# Patient Record
Sex: FEMALE | Race: WHITE
[De-identification: names, ages, dates, MRNs, and addresses within clinical notes are randomized per-mention and may not be internally consistent; named-entity substitution may affect disease eponyms.]

---

## 2017-03-05 ENCOUNTER — HOSPITAL ENCOUNTER (EMERGENCY)
Dept: HOSPITAL 18 - NAV ERS | Age: 82
Discharge: HOME | End: 2017-03-05
Payer: MEDICARE

## 2017-03-05 DIAGNOSIS — I10: ICD-10-CM

## 2017-03-05 DIAGNOSIS — S92.351A: ICD-10-CM

## 2017-03-05 DIAGNOSIS — S40.021A: ICD-10-CM

## 2017-03-05 DIAGNOSIS — S09.90XA: Primary | ICD-10-CM

## 2017-03-05 DIAGNOSIS — F41.9: ICD-10-CM
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
## 2017-03-05 PROCEDURE — 70450 CT HEAD/BRAIN W/O DYE: CPT

## 2017-03-05 NOTE — RAD
RIGHT HUMERUS 2 VIEWS:

 

INDICATION: 

Right shoulder bruising and pain subsequent to fall.

 

FINDINGS: 

Metallic anchor of the proximal right humerus present.  There is no fracture or dislocation.

 

IMPRESSION: 

No acute osseous abnormality of the right humerus.

 

POS: [facility]

## 2017-03-05 NOTE — RAD
RADIOGRAPH OF RIGHT FOOT 3 VIEWS:

 

INDICATION: 

Fall with pain.  

 

COMPARISON: 

No prior imaging.

 

FINDINGS: 

There is an obliquely oriented fracture of the mid to distal diaphysis of the 5th metatarsal with ov
erlying soft tissue swelling.  Subtle fracture lucency involves the medial aspect of the articular s
urface of the 5th metatarsal head.  Scattered osteoarthritis present.

 

IMPRESSION: 

Obliquely oriented, mildly displaced fracture of the 5th metatarsal with subtle involvement of the m
edial articular surface of the 5th metatarsal head.  There is overlying soft tissue swelling.

 

POS: [facility]

## 2017-03-05 NOTE — CT
CT HEAD NONCONTRAST:

 

INDICATION: 

Fall with head injury and pain.

 

FINDINGS: 

There is age-related parenchymal volume loss with compensatory dilatation of the ventricular system.
  Mild chronic microvascular ischemic disease.  Punctate hypodensity in the left thalamus may relate
 to lacunar infarction.  No intracranial hemorrhage, mass effect, or midline shift.  No paranasal si
nus fluid level.  No evidence of compressed calvarial fracture or pneumocephalus.

 

IMPRESSION: 

No intracranial hemorrhage or mass effect.

 

POS: [facility]

## 2018-12-14 ENCOUNTER — HOSPITAL ENCOUNTER (OUTPATIENT)
Dept: HOSPITAL 92 - SCSCT | Age: 83
Discharge: HOME | End: 2018-12-14
Attending: ANESTHESIOLOGY
Payer: MEDICARE

## 2018-12-14 DIAGNOSIS — M43.16: ICD-10-CM

## 2018-12-14 DIAGNOSIS — Z98.1: ICD-10-CM

## 2018-12-14 DIAGNOSIS — M48.07: ICD-10-CM

## 2018-12-14 DIAGNOSIS — M48.061: ICD-10-CM

## 2018-12-14 DIAGNOSIS — M47.816: ICD-10-CM

## 2018-12-14 DIAGNOSIS — M96.1: Primary | ICD-10-CM

## 2018-12-14 PROCEDURE — 72131 CT LUMBAR SPINE W/O DYE: CPT

## 2018-12-14 NOTE — CT
CT LUMBAR SPINE WITHOUT CONTRAST:

 

HISTORY: 

Low back pain.  The patient had a stimulator placed 4 months ago.  

 

COMPARISON: 

5/18/2016.

 

TECHNIQUE: 

Lumbar spine CT is performed without contrast.  Reformatted images are submitted for interpretation.

 

FINDINGS: 

There are bilateral transpedicular screws at L4, L5, and S1.  There is no evidence of perihardware zain
cency.  Lumbar spine vertebral height is maintained.  There is no fracture.  Dorsal column stimulator
 enters the central spinal canal at approximately the T12-L1 level.  Distal tips are not included on 
this exam.

 

There is mild rightward curvature of the lumbar spine.  Vacuum disk phenomenon at L1-L2, L2-L3, L3-L4
, and L4-L5.

 

There is 4.4 mm of retrolisthesis of L1 upon L2, 3.8 mm of retrolisthesis of L2 upon L3, 5.2 mm of an
terolisthesis of L4 upon L5, 11 mm anterolisthesis of L5 upon S1.  

 

Laminectomy defect at L3-L4, L4-L5, and the L5-S1 disk space.  There appear to be bilateral pars defe
cts at L5. 

 

Symmetric attenuation of the psoas muscles.  Exophytic hypodensity emanating from the left renal shital
ex measuring 7 cm compatible with a cyst.  Other visualized solid organs are unremarkable.

 

Limited evaluation of the contents of the central spinal canal and neural foramen due to technique.

 

T10-T11, T11-T12, and T12-L1:  No significant central canal stenosis of foraminal narrowing.

 

L1-L2:  Broad-based disk bulge and ligamentum flavum thickening result in mild central canal stenosis
.  Moderate to severe right and moderate left neural foraminal narrowing.

 

L2-L3:  Broad-based disk bulge, ligamentum flavum thickening, and facet hypertrophy result in moderat
e central canal stenosis.  Right neural foramen is moderately narrowed.  There is severe left foramin
al narrowing.

 

L3-L4:  Broad-based disk bulge is present.  Limited evaluation of the central spinal canal due to yenifer
m-attenuation artifact.  There appears to be narrowing of the left subarticular zone secondary to dis
k material as well as osteophyte formation from the end plate.  There is presumed obscuration of the 
traversing left L4 nerve root.  Overall, there is mild central canal stenosis.  Moderate to severe bi
lateral foraminal narrowing.

 

L4-L5:  Posterior laminectomy defect.  No high-grade central canal stenosis.  Moderate bilateral fora
cha narrowing.

 

L5-S1:  Posterior laminectomy defect.  No high-grade central canal stenosis.  Severe bilateral forami
nal narrowing.

 

IMPRESSION: 

1.  Lumbar fusion changes as above.

 

2.  Varying degrees of central canal stenosis and foraminal narrowing as detailed above.

 

3.  Spondylolysis of L5.  There is associated grade I anterolisthesis of L5 upon S1.

 

POS: [facility]

## 2018-12-16 ENCOUNTER — HOSPITAL ENCOUNTER (EMERGENCY)
Dept: HOSPITAL 18 - NAV ERS | Age: 83
Discharge: HOME | End: 2018-12-16
Payer: MEDICARE

## 2018-12-16 DIAGNOSIS — E78.5: ICD-10-CM

## 2018-12-16 DIAGNOSIS — I10: ICD-10-CM

## 2018-12-16 DIAGNOSIS — Z79.899: ICD-10-CM

## 2018-12-16 DIAGNOSIS — R04.2: Primary | ICD-10-CM

## 2018-12-16 DIAGNOSIS — F41.9: ICD-10-CM

## 2018-12-16 LAB
BASOPHILS # BLD AUTO: 0.1 THOU/UL (ref 0–0.2)
BASOPHILS NFR BLD AUTO: 1.1 % (ref 0–1)
EOSINOPHIL # BLD AUTO: 0.2 THOU/UL (ref 0–0.7)
EOSINOPHIL NFR BLD AUTO: 4.2 % (ref 0–10)
HGB BLD-MCNC: 12.4 G/DL (ref 12–16)
LYMPHOCYTES # BLD AUTO: 2 THOU/UL (ref 1.2–3.4)
LYMPHOCYTES NFR BLD AUTO: 36.8 % (ref 21–51)
MCH RBC QN AUTO: 28.8 PG (ref 27–31)
MCV RBC AUTO: 90.8 FL (ref 78–98)
MONOCYTES # BLD AUTO: 0.5 THOU/UL (ref 0.11–0.59)
MONOCYTES NFR BLD AUTO: 9.6 % (ref 0–10)
NEUTROPHILS # BLD AUTO: 2.6 THOU/UL (ref 1.4–6.5)
NEUTROPHILS NFR BLD AUTO: 48.3 % (ref 42–75)
PLATELET # BLD AUTO: 234 THOU/UL (ref 130–400)
RBC # BLD AUTO: 4.29 MILL/UL (ref 4.2–5.4)
WBC # BLD AUTO: 5.5 THOU/UL (ref 4.8–10.8)

## 2018-12-16 PROCEDURE — 71046 X-RAY EXAM CHEST 2 VIEWS: CPT

## 2018-12-16 PROCEDURE — 85025 COMPLETE CBC W/AUTO DIFF WBC: CPT

## 2018-12-16 NOTE — RAD
RADIOGRAPH CHEST 2 VIEWS:

 

HISTORY: 

An 83-year-old female with hemoptysis.

 

FINDINGS:

There is no air space density, pulmonary edema, pleural effusion, pneumothorax, or cardiomegaly.

 

IMPRESSION: 

No acute cardiopulmonary findings.

 

 

jn []

 

POS: [facility]

## 2019-04-23 ENCOUNTER — HOSPITAL ENCOUNTER (OUTPATIENT)
Dept: HOSPITAL 92 - SDC | Age: 84
Discharge: HOME | End: 2019-04-23
Attending: ANESTHESIOLOGY
Payer: MEDICARE

## 2019-04-23 VITALS — BODY MASS INDEX: 23.3 KG/M2

## 2019-04-23 DIAGNOSIS — I10: ICD-10-CM

## 2019-04-23 DIAGNOSIS — Z79.899: ICD-10-CM

## 2019-04-23 DIAGNOSIS — G89.29: ICD-10-CM

## 2019-04-23 DIAGNOSIS — T85.192A: Primary | ICD-10-CM

## 2019-04-23 DIAGNOSIS — M54.16: ICD-10-CM

## 2019-04-23 DIAGNOSIS — Z88.5: ICD-10-CM

## 2019-04-23 DIAGNOSIS — Z88.8: ICD-10-CM

## 2019-04-23 DIAGNOSIS — Z88.2: ICD-10-CM

## 2019-04-23 DIAGNOSIS — M48.061: ICD-10-CM

## 2019-04-23 PROCEDURE — 76000 FLUOROSCOPY <1 HR PHYS/QHP: CPT

## 2019-04-23 PROCEDURE — 0JPT0MZ REMOVAL OF STIMULATOR GENERATOR FROM TRUNK SUBCUTANEOUS TISSUE AND FASCIA, OPEN APPROACH: ICD-10-PCS | Performed by: ANESTHESIOLOGY

## 2019-04-23 PROCEDURE — 00PU3MZ REMOVAL OF NEUROSTIMULATOR LEAD FROM SPINAL CANAL, PERCUTANEOUS APPROACH: ICD-10-PCS | Performed by: ANESTHESIOLOGY

## 2019-04-24 NOTE — OP
DATE OF PROCEDURE:  04/23/2019



PREOPERATIVE DIAGNOSES:  

1. Lumbar stenosis.

2. Chronic radiculopathy.

3. Chronic pain.

4. Failure of spinal cord stimulator.



POSTOPERATIVE DIAGNOSES:  

1. Lumbar stenosis.

2. Chronic radiculopathy.

3. Chronic pain.

4. Failure of spinal cord stimulator.



PROCEDURES PERFORMED:  

1. Removal of spinal cord stimulation electrode array on the right side.

2. Removal of spinal cord stimulation electrode array on the left side.

3. Removal of internal pulse generator.



ANESTHESIA:  TIVA.



COMPLICATIONS:  None.



ESTIMATED BLOOD LOSS:  Less than 10 mL.



SUMMARY:  Risks and benefits were discussed.  Informed consent was obtained and 
the

patient was taken to the OR, prepped and draped in standard fashion using

ChloraPrep, let to dry.  1% lidocaine mixed with 0.25% Marcaine with epinephrine

50:50 was used for skin and subcutaneous anesthesia.  The posterior incision was

chosen first, incision and blunt dissection were made down, exposing the lead

anchors easily.  They were dissected out.  Each lead suture anchor was 
identified,

ligated, and removed.  The right anchor was then removed along with the right 
lead.

This was repeated for the left electrode array, each was sutures with two 2-0 
silk

sutures.  They were ligated and removed and the left electrode array was 
removed.

Attention was then turned to the IPG in the right buttock.  Incision was made 
over

blunt dissection to expose the internal pulse generator.  It was easily removed 
from

the pocket and the leads were removed from the tunnel completing the explant of 
both

electrodes arrays, both lead anchors, retaining sutures, and the internal pulse

generator.  All counts were correct x2.  Closure was accomplished at both 
incisions

in layers with interrupted 2-0 Vicryl and the skin was closed with a

running subcuticular 4-0 Rapide at each incision.  Dermabond was used to seal 
the

skin.  Dermabond was let dry, 4 x 4 dressings were placed along with Medipore 
tape.

No complications.  Tolerated the procedure well. 







Job ID:  [id number]



[facility]

## 2019-07-02 ENCOUNTER — HOSPITAL ENCOUNTER (OUTPATIENT)
Dept: HOSPITAL 92 - TBSIIMAG | Age: 84
Discharge: HOME | End: 2019-07-02
Attending: ANESTHESIOLOGY
Payer: MEDICARE

## 2019-07-02 DIAGNOSIS — M47.816: ICD-10-CM

## 2019-07-02 DIAGNOSIS — M48.062: Primary | ICD-10-CM

## 2019-07-02 DIAGNOSIS — Z98.1: ICD-10-CM

## 2019-07-02 DIAGNOSIS — M16.11: ICD-10-CM

## 2019-07-02 LAB — ESTIMATED GFR-MDRD - POC: 60

## 2019-07-02 PROCEDURE — 74018 RADEX ABDOMEN 1 VIEW: CPT

## 2019-07-02 PROCEDURE — 72158 MRI LUMBAR SPINE W/O & W/DYE: CPT

## 2019-07-02 PROCEDURE — 82565 ASSAY OF CREATININE: CPT

## 2019-07-02 NOTE — MRI
MRI OF THE LUMBAR SPINE WITH AND WITHOUT CONTRAST:

7/2/19

 

HISTORY: 

Lumbar stenosis with neurogenic claudication.

Previous lumbar surgery, x2. Multiple falls. Right leg and low back pain. 

 

COMPARISON: 

None.

 

TECHNIQUE:  

MRI of the lumbar spine is performed with and without intravenous gadolinium administration. Multiseq
uential, multiplanar imaging is performed.     

 

FINDINGS: 

There are bilateral transpedicular screws at L4, L5 and S1. Associated metallic susceptibility artifa
ct. There is Ts1 marrow signal hypointensity with associated T2 and STIR hyperintensity on the superi
or end plate of L4 likely due to degenerative change. 5 mm of retrolisthesis of L2 upon L3, 4 mm of r
etrolisthesis of L3 upon L4, 7.8 mm of anterolisthesis of L5 upon S1. 

 

Appropriate signal intensity in the paraspinal muscles. 

 

Large T2 hyperintense lesion in the left kidney is presumed to be a cyst. 

 

T11-T12: Left and right paracentral disc bulges, ligamentum flavum thickening and facet hypertrophy r
esult in mild central canal stenosis. Neural foramina are patent bilaterally. 

 

T12-L1: Small left and right paracentral disc bulges, along with ligamentum flavum thickening and fac
et hypertrophy resulting in mild central canal stenosis. Mild bilateral foraminal narrowing. 

 

L1-L2: Severe loss of disc space height. Generalized disc bulge, ligamentum flavum thickening and fac
et hypertrophy result in mild central canal stenosis. Narrowing of the right subarticular zone with m
ass effect and obscuration of the traversing right L2 nerve root. Moderate right and left neural fora
cha narrowing.

 

L2-L3: Desiccation with severe loss of disc space height. There is a left subarticular disc osteophyt
e complex. There is a left laminectomy defect. Mild enhancing scar tissue at the operative site. Over
all, there is mild central canal stenosis. There is moderate right and severe left neural foraminal n
arrowing. 

 

L3-L4: Desiccation with severe loss of disc space height. There appears to be narrowing of the left s
ubarticular zone secondary to disc material. At least mild central canal stenosis. Moderate right and
 moderate to severe left foraminal narrowing. 

 

L4-L5: Moderate loss of disc space height. Left laminectomy defect is noted. No evidence of high grad
e central canal stenosis. Moderate to severe right and moderate left foraminal narrowing. 

 

L5-S1:  Posterior laminectomy defect. There is a T2 hyperintense lesion with peripheral enhancement m
easuring 1.7 cm craniocaudal x 3.1 cm mediolateral x 1 cm anterior posterior. Postoperative fluid col
lection is favored. An infected fluid collection cannot be excluded. 

 

Postcontrast images do not demonstrate any abnormal enhancement within the thecal sac including the c
auda equina and conus medullaris. 

 

IMPRESSION: 

1.      Extensive lumbar fusion as described above. There is a peripherally enhancing cystic lesion a
t the operative site, which is presumed to be due to postoperative change versus infected fluid colle
ction. Correlate clinically. 

2.      Varying degrees of central canal stenosis and neural foraminal narrowing as detailed above.

 

Code T 

 

POS: [facility]

## 2019-09-13 ENCOUNTER — HOSPITAL ENCOUNTER (OUTPATIENT)
Dept: HOSPITAL 92 - SCSCT | Age: 84
Discharge: HOME | End: 2019-09-13
Attending: INTERNAL MEDICINE
Payer: MEDICARE

## 2019-09-13 DIAGNOSIS — Q61.9: ICD-10-CM

## 2019-09-13 DIAGNOSIS — R10.84: Primary | ICD-10-CM

## 2019-09-13 LAB — ESTIMATED GFR-MDRD - POC: 69

## 2019-09-13 PROCEDURE — 82565 ASSAY OF CREATININE: CPT

## 2019-09-13 PROCEDURE — 74177 CT ABD & PELVIS W/CONTRAST: CPT

## 2019-09-13 NOTE — CT
CT ABDOMEN AND PELVIS WITH IV CONTRAST:

9/13/19

 

Oral contrast was also given.

 

Multiplanar reconstruction.

 

INDICATION: 

Abdominal pain. 

 

There are no comparison CT abdomen and pelvis exams. Comparison is made to a prior CT chest from 2015
. 

 

Images through the lung bases reveal interstitial thickening and numerous small pulmonary nodules. A 
larger nodule in the left lung base measuring up to 8 to 9 mm is again seen. These nodular opacities 
appear stable. 

 

Liver, spleen, and pancreas appear unremarkable. 

 

The gallbladder is mildly distended. No pericholecystic edema seen. Gallstones may not be apparent on
 CT. 

 

The adrenal glands are unremarkable. 

 

There is a large left renal cyst which was imaged on the prior chest CT and does appear stable. This 
cyst measures up to 7 cm. 

 

There is another complex low density probable cystic lesion which is exophytic from the posterior mid
 left kidney measuring 1.2 cm. This lesion exhibits peripheral calcification and possibly peripheral 
enhancement. This lesion was also imaged on the prior exam and appears stable. 

 

Tiny low densities subcentimeter cystic lesions in the right kidney appear stable. No hydronephrosis 
or urinary calculus.

 

Urinary bladder mildly distended and unremarkable. 

 

Aorta normal caliber. 

 

Small bowel loops appear normal. Appendix is not identified. Stool throughout the colon. Degenerative
 and postoperative changes in the lumbar spine. 

 

IMPRESSION: 

1.      No acute process. Renal cystic lesions appear stable. A complex exophytic lesion from the pos
terior mid left kidney appears stable from the prior exam of 2015. 

2.      Numerous nodules in the lung bases with interstitial thickening appears stable. 

 

POS: [facility]

## 2020-01-05 ENCOUNTER — HOSPITAL ENCOUNTER (OUTPATIENT)
Dept: HOSPITAL 92 - ERS | Age: 85
Setting detail: OBSERVATION
LOS: 2 days | Discharge: HOME | End: 2020-01-07
Attending: FAMILY MEDICINE | Admitting: FAMILY MEDICINE
Payer: MEDICARE

## 2020-01-05 ENCOUNTER — HOSPITAL ENCOUNTER (EMERGENCY)
Dept: HOSPITAL 18 - NAV ERS | Age: 85
Discharge: ANOTHER HOSPITAL | End: 2020-01-05
Payer: MEDICARE

## 2020-01-05 VITALS — BODY MASS INDEX: 21.7 KG/M2

## 2020-01-05 DIAGNOSIS — Z88.1: ICD-10-CM

## 2020-01-05 DIAGNOSIS — F41.9: ICD-10-CM

## 2020-01-05 DIAGNOSIS — E78.5: ICD-10-CM

## 2020-01-05 DIAGNOSIS — Z79.899: ICD-10-CM

## 2020-01-05 DIAGNOSIS — R07.89: Primary | ICD-10-CM

## 2020-01-05 DIAGNOSIS — E03.9: ICD-10-CM

## 2020-01-05 DIAGNOSIS — R91.1: ICD-10-CM

## 2020-01-05 DIAGNOSIS — R00.2: ICD-10-CM

## 2020-01-05 DIAGNOSIS — Z88.8: ICD-10-CM

## 2020-01-05 DIAGNOSIS — Z88.2: ICD-10-CM

## 2020-01-05 DIAGNOSIS — E78.00: ICD-10-CM

## 2020-01-05 DIAGNOSIS — I10: ICD-10-CM

## 2020-01-05 DIAGNOSIS — Z88.5: ICD-10-CM

## 2020-01-05 LAB
ALBUMIN SERPL BCG-MCNC: 3.7 G/DL (ref 3.4–4.8)
ALP SERPL-CCNC: 71 U/L (ref 40–110)
ALT SERPL W P-5'-P-CCNC: 22 U/L (ref 8–55)
ANION GAP SERPL CALC-SCNC: 12 MMOL/L (ref 10–20)
AST SERPL-CCNC: 60 U/L (ref 5–34)
BASOPHILS # BLD AUTO: 0.1 THOU/UL (ref 0–0.2)
BASOPHILS NFR BLD AUTO: 1.5 % (ref 0–1)
BILIRUB SERPL-MCNC: 0.2 MG/DL (ref 0.2–1.2)
BUN SERPL-MCNC: 20 MG/DL (ref 9.8–20.1)
CALCIUM SERPL-MCNC: 9.7 MG/DL (ref 7.8–10.44)
CHLORIDE SERPL-SCNC: 100 MMOL/L (ref 98–107)
CO2 SERPL-SCNC: 27 MMOL/L (ref 23–31)
CREAT CL PREDICTED SERPL C-G-VRATE: 0 ML/MIN (ref 70–130)
EOSINOPHIL # BLD AUTO: 0.2 THOU/UL (ref 0–0.7)
EOSINOPHIL NFR BLD AUTO: 3.5 % (ref 0–10)
GLOBULIN SER CALC-MCNC: 3.1 G/DL (ref 2.4–3.5)
GLUCOSE SERPL-MCNC: 95 MG/DL (ref 83–110)
HGB BLD-MCNC: 11.1 G/DL (ref 12–16)
LIPASE SERPL-CCNC: 54 U/L (ref 8–78)
LYMPHOCYTES # BLD AUTO: 1.3 THOU/UL (ref 1.2–3.4)
LYMPHOCYTES NFR BLD AUTO: 29.2 % (ref 21–51)
MCH RBC QN AUTO: 30.2 PG (ref 27–31)
MCV RBC AUTO: 95.6 FL (ref 78–98)
MONOCYTES # BLD AUTO: 0.6 THOU/UL (ref 0.11–0.59)
MONOCYTES NFR BLD AUTO: 12.3 % (ref 0–10)
NEUTROPHILS # BLD AUTO: 2.4 THOU/UL (ref 1.4–6.5)
NEUTROPHILS NFR BLD AUTO: 53.6 % (ref 42–75)
PLATELET # BLD AUTO: 184 THOU/UL (ref 130–400)
POTASSIUM SERPL-SCNC: 4.1 MMOL/L (ref 3.5–5.1)
RBC # BLD AUTO: 3.67 MILL/UL (ref 4.2–5.4)
SODIUM SERPL-SCNC: 135 MMOL/L (ref 136–145)
TROPONIN I SERPL DL<=0.01 NG/ML-MCNC: 0.01 NG/ML (ref ?–0.03)
WBC # BLD AUTO: 4.5 THOU/UL (ref 4.8–10.8)

## 2020-01-05 PROCEDURE — A9500 TC99M SESTAMIBI: HCPCS

## 2020-01-05 PROCEDURE — 85025 COMPLETE CBC W/AUTO DIFF WBC: CPT

## 2020-01-05 PROCEDURE — 71046 X-RAY EXAM CHEST 2 VIEWS: CPT

## 2020-01-05 PROCEDURE — 71275 CT ANGIOGRAPHY CHEST: CPT

## 2020-01-05 PROCEDURE — 84484 ASSAY OF TROPONIN QUANT: CPT

## 2020-01-05 PROCEDURE — 93005 ELECTROCARDIOGRAM TRACING: CPT

## 2020-01-05 PROCEDURE — 99285 EMERGENCY DEPT VISIT HI MDM: CPT

## 2020-01-05 PROCEDURE — 36415 COLL VENOUS BLD VENIPUNCTURE: CPT

## 2020-01-05 PROCEDURE — 80053 COMPREHEN METABOLIC PANEL: CPT

## 2020-01-05 PROCEDURE — 76705 ECHO EXAM OF ABDOMEN: CPT

## 2020-01-05 PROCEDURE — 78451 HT MUSCLE IMAGE SPECT SING: CPT

## 2020-01-05 PROCEDURE — 83690 ASSAY OF LIPASE: CPT

## 2020-01-05 PROCEDURE — 83880 ASSAY OF NATRIURETIC PEPTIDE: CPT

## 2020-01-05 PROCEDURE — 85379 FIBRIN DEGRADATION QUANT: CPT

## 2020-01-05 PROCEDURE — G0378 HOSPITAL OBSERVATION PER HR: HCPCS

## 2020-01-05 NOTE — RAD
PA AND LATERAL CHEST:

 

Date:  01/05/20

 

HISTORY:  

Shortness of breath. 

 

FINDINGS:

 

Comparison made with exam of 12/16/18. 

 

The heart size is normal. The lungs are expanded without focal areas of consolidation, pneumothoraces
, or pleural effusions. There are degenerative changes in the spine. 

 

IMPRESSION: 

No acute process. 

 

 

 

POS: [facility]

## 2020-01-05 NOTE — CT
CT ANGIO CHEST PERFORMED WITH IV CONTRAST ENHANCEMENT WITH 3D RECONSTRUCTIONS:

 

Date:  01/05/2020

 

HISTORY:  

Chest pain. Elevated D-Dimer. Shortness of breath. 

 

COMPARISON:  

CT abdomen and pelvis performed 09/13/19. 

 

FINDINGS:

There are numerous small bilateral pulmonary nodules noted. The larger nodules are within the left lo
wer lobe where nodules in the 8-9 mm range are seen. In comparing to the previous CT of the abdomen, 
these lower lobe pulmonary nodules appear stable. On that exam, it is described as the pulmonary nodu
les being stable as compared to a 2015 exam, which I do not have for comparison. 

 

There is no significant mediastinal, hilar, or axillary lymphadenopathy. 

 

The thoracic aorta is normal in caliber. There is good pulmonary artery opacification and no CT evide
nce for pulmonary embolus. 

 

Visualized liver parenchyma shows no findings. 

 

A left renal cyst is partially visualized. 

 

IMPRESSION: 

1.  No CT evidence of pulmonary embolus. 

2.  Numerous pulmonary nodules. The etiology of these are unclear. These are not calcified. Metastati
c disease is not excluded. The nodules in the lung bases are stable as compared to the 09/13/19 study
. 

 

 

POS: [facility]

## 2020-01-06 LAB — TROPONIN I SERPL DL<=0.01 NG/ML-MCNC: (no result) NG/ML (ref ?–0.03)

## 2020-01-06 RX ADMIN — HYDROCODONE BITARTRATE AND ACETAMINOPHEN PRN TAB: 7.5; 325 TABLET ORAL at 17:44

## 2020-01-06 RX ADMIN — HYDROCODONE BITARTRATE AND ACETAMINOPHEN PRN TAB: 7.5; 325 TABLET ORAL at 09:04

## 2020-01-06 NOTE — HP
CHIEF COMPLAINT:  Abdominal pain.



HISTORY OF PRESENT ILLNESS:  Patient is an 84-year-old female who stated that when

her dog jumped to her lap, she started having this burning sensation starting from

her abdomen going up to her neck and she was little nauseated, so she came into the

hospital for further evaluation.  The patient states that whenever her dog jumps in

her lap, these symptoms occur and she thought that she is "allergic to the dog."

Patient's dog weighs about 4 pounds.  Patient recently had an EGD last month, which

did not show any acute abnormalities.  The pathology specimen only indicated patient

has mild active esophagitis with some reflux.  No Brooke's was noted.  Patient

states that she has had lower abdominal pain.  She has had colonoscopies and

continues to have the abdominal pain without any active diagnosis. 



PAST MEDICAL HISTORY:  As of the following:  She has a history of hyperlipidemia,

hypercholesterolemia, hypertension, hypothyroidism, and chronic pain. 



SURGICAL HISTORY:  She has had a hysterectomy and right shoulder surgery.



PSYCHIATRIC HISTORY:  Includes anxiety.



SOCIAL HISTORY:  She denies any alcohol use, drug use, or smoking history.  She is a

full code.  Lives with her family. 



ALLERGIES:  SHE IS ALLERGIC TO CIPROFLOXACIN, NAUSEA; CODEINE, NAUSEA; MORPHINE,

RASH; SULFA, NAUSEA; AND ZOLPIDEM. 



MEDICATIONS:  She is on:

1. Celexa 20 mg daily.

2. Levothyroxine 112 mcg daily.

3. Alprazolam 1 mg p.o. daily.

4. Metoprolol 50 mg b.i.d.



PHYSICAL EXAMINATION:

VITAL SIGNS:  Temperature of 98.8, __________, 16, pulse is 63, and blood pressure

is 120/60. 

GENERAL:  She is awake, alert, and oriented x3.  Does not appear in distress. 

HEENT:  Normocephalic and atraumatic.  No lymphadenopathy noted.  Pupils equal and

reactive to light. 

CV:  S1 and S2 present.  No murmurs, rubs, or gallops. 

LUNGS:  Clear to auscultation.  No rhonchi or wheezes noted. 

ABDOMEN:  Soft.  Bowel sounds present x2.  Mild pain upon palpation to bilateral

lower abdominal area. 

EXTREMITIES:  No edema noted.  Pedal pulses are present x2. NEUROVASCULAR:  There

are no focal deficits noted. 

SKIN:  No cuts, lesions, or bruises noted.



LABORATORY:  Her troponin x3 were negative.  EKG, no acute changes.  WBCs of 4.5,

hemoglobin of 11.1, hematocrit of 35.1, and platelets of 184.  Her D-dimer was

elevated at 1.1.  Her BNP was 240.  Sodium of 135, potassium of 4.1, BUN of 20,

creatinine 0.86, and AST of __________. 



ASSESSMENT/PLAN:  Patient is a very pleasant 84-year-old female who presents to the

hospital with complaints of atypical chest pain. 

1. Atypical chest pain.  Patient did have a CTA which indicated multiple pulmonary

nodules.  She is aware of this.  She states that she is supposed to follow up with a

CAT scan as an outpatient.  No embolism was noted.  She has no cardiac history.  Her

troponins x3 were negative.  We will continue her home medications.  We will also go

ahead and order a stress test given her risk factors and her age.  If the stress

test is negative, I will discharge her.  I will also get a right upper quadrant

ultrasound to rule out any other etiology, however, she was seen in the hospital in

September of 2019, she did have a CT of abdomen and pelvis, which indicated just

numerous nodules to the lungs and also a renal cyst that was stable and a complex

exophytic lesion from the posterior mid left kidney, which appears to be stable from

exam of 2015.  Patient is aware of this. 

2. History of anxiety.  We will continue her home medications.

3. Hypertension.  We will continue her home medications.

4. Deep venous thrombosis prophylaxis.  We will put the patient on SCDs.







Job ID:  [id number]

## 2020-01-07 VITALS — SYSTOLIC BLOOD PRESSURE: 158 MMHG | DIASTOLIC BLOOD PRESSURE: 67 MMHG

## 2020-01-07 VITALS — TEMPERATURE: 97.8 F

## 2020-01-07 RX ADMIN — HYDROCODONE BITARTRATE AND ACETAMINOPHEN PRN TAB: 7.5; 325 TABLET ORAL at 08:13

## 2020-01-07 NOTE — ULT
Sonogram right upper quadrant



HISTORY: Right upper quadrant pain.



FINDINGS: Gallbladder shows no focal abnormalities. Common duct is 0.3 cm. Liver within normal limits
. No free fluid. Incidental note of a small cyst of the right kidney.



IMPRESSION: No evidence of gallstones or acute biliary obstruction.



Reported By: KENZIE Burnett 

Electronically Signed:  1/7/2020 7:39 AM

## 2020-01-07 NOTE — NM
NM Card Spec Sgl sty st or rst



History: Chest pain



Comparison: None.



Findings: Rest only images were obtained. The patient refused stress component of the exam.



Only nonattenuation corrected rest images were obtained.



Impression: Nondiagnostic examination as patient refused the stress component of the exam and only no
nattenuation corrected images were obtained.



Reported By: Subhash Pearce 

Electronically Signed:  1/7/2020 9:05 AM

## 2020-01-09 NOTE — DIS
DATE OF ADMISSION:  01/05/2020



DATE OF DISCHARGE:  01/07/2020



DISCHARGE DIAGNOSES:  

1. Atypical chest pain.

2. Anxiety.

3. Hypertension.

4. Pulmonary nodules.



HOSPITAL COURSE:  The patient is a very pleasant 84-year-old female who initially

presented to the hospital for having a burning like sensation starting from her

abdomen going up to her neck area.  She stated that whenever her dog jumps on her

lap, she gets that sensation.  The patient recently had an EGD last month and did

not show any acute abnormalities.  The patient at this time did undergo a stress

test, however, she only did the first part of the stress test and she refused to do

the second part of the stress test.  The patient did have a CTA to rule out a PE.

The PE was ruled out.  She did have numerous pulmonary nodules, which she is aware

of, which she is supposed to get further testing for this. 



I did talk extensively with the patient.  Also got a right upper quadrant

ultrasound, which did not indicate any gallstones or any biliary obstruction.  The

patient stated that she felt better.  She has been through a lot of issues

especially with her family and has been having a lot of anxiety.  I will discharge

the patient home.  She will follow up with her primary and also she is aware of her

pulmonary nodules and she is going to get a workup as an outpatient. 



HOME MEDICATIONS:  

1. Levothyroxine 50 mcg daily.

2. Metoprolol 1 p.o. daily.

3. Pantoprazole 40 mg twice a day.

4. Alprazolam 2 mg daily.

5. Duloxetine 20 mg p.o. daily.



PHYSICAL EXAMINATION:

VITAL SIGNS:  Temperature 97.8, pulse 63, respirations 15, 97% on room air, blood

pressure 158/67. 

GENERAL:  She is awake, alert, and oriented x3.  Does not appear in any distress. 

CV:  S1 and S2 present.  No murmurs, rubs, or gallops. 

ABDOMEN:  Soft and nontender.  Bowel sounds are present x2.



DISCHARGE INSTRUCTIONS:  Again, she will be discharged home.  She will follow up

with her primary. 







Job ID:  [id number]

## 2020-03-21 ENCOUNTER — HOSPITAL ENCOUNTER (EMERGENCY)
Dept: HOSPITAL 92 - ERS | Age: 85
Discharge: HOME | End: 2020-03-21
Payer: MEDICARE

## 2020-03-21 DIAGNOSIS — E03.9: ICD-10-CM

## 2020-03-21 DIAGNOSIS — E78.00: ICD-10-CM
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
## 2020-03-21 DIAGNOSIS — E78.5: ICD-10-CM

## 2020-03-21 DIAGNOSIS — I10: ICD-10-CM

## 2020-03-21 DIAGNOSIS — S00.03XA: Primary | ICD-10-CM

## 2020-03-21 DIAGNOSIS — S30.0XXA: ICD-10-CM

## 2020-03-21 DIAGNOSIS — Z79.899: ICD-10-CM
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
## 2020-03-21 DIAGNOSIS — F41.9: ICD-10-CM

## 2020-03-21 DIAGNOSIS — R11.0: ICD-10-CM

## 2020-03-21 PROCEDURE — 96374 THER/PROPH/DIAG INJ IV PUSH: CPT

## 2020-03-21 PROCEDURE — 72125 CT NECK SPINE W/O DYE: CPT

## 2020-03-21 PROCEDURE — 72220 X-RAY EXAM SACRUM TAILBONE: CPT

## 2020-03-21 PROCEDURE — 70450 CT HEAD/BRAIN W/O DYE: CPT

## 2020-03-21 NOTE — CT
PRELIMINARY REPORT/[facility]/EMERGENCY AFTER HOURS PROCEDURE:



EXAM: CT Cervical Spine Without Intravenous Contrast. 



CLINICAL HISTORY: ER 2... 84F brought in via EMS c/o sacral & occipital pain s/p mechanical fall at a
ssisted living facility. 



TECHNIQUE: Axial computed tomography images of the cervical spine without intravenous contrast. Sagit
donald and coronal reformations performed. 



COMPARISON:  CT  - CT BRAIN WO CON  - 03/21/2020 01:18 AM CDT 



FINDINGS: 

BONES: No acute fracture or focal osseous lesion. Bony alignment is anatomic. 

DISCS / DEGENERATIVE CHANGES: Moderate to severe multilevel cervical spondylosis. 

SOFT TISSUES: Scattered nodules in the lung apices, largest measuring 4 mm on the right lung apex. 

MISCELLANEOUS: Mild smooth interlobular septal thickening and centrilobular groundglass. 



IMPRESSION: 

1. No acute abnormality. 

2. Moderate to severe multilevel cervical spondylosis. 

3. Scattered nodules in the lung apices, largest measuring 4 mm on the right lung apex.  If patient i
s at high risk for pulmonary malignancy, consider follow-up CT chest in one year. 

4. Mild smooth interlobular septal thickening and centrilobular groundglass.  This can be seen with m
ild pulmonary edema.



ELECTRONICALLY SIGNED BY:

Patricio Cerrato DO

Mar 21, 2020 1:33:26 AM CDT





FINAL REPORT



CT CERVICAL SPINE WITHOUT CONTRAST:



HISTORY: 

Trauma. Pain.



COMPARISON: 

None



FINDINGS: 

No craniocervical dissociation. Appropriate alignment of the lateral masses of C1 and C2. Intact odon
toid process.



Appropriate alignment of the facets. Slight reversal cervical lordosis centered at the C5-C6 level.

Soft tissue neck structures: No mass, lymphadenopathy or hematoma. No prevertebral soft tissue swelli
ng.



Upper mediastinum and lung apices: Groundglass opacities with septal thickening. Well-circumscribed s
olid nodule in the right lung apex measuring 0.4 cm.



Central spinal canal: Severe degenerative disc disease at C5-C6 and C6-C7. There are varying degrees 
of central canal stenosis and neural foraminal narrowing on the basis of degenerative change.



Vertebral bodies: Cervical spine vertebral body height is maintained. No fracture.





IMPRESSION:

1. This report is in agreement with the initial report by [facility]. No cervical spine fractur
e.

2. Solid nodule in the right lung apex.

Code lung nodule

Transcribed Date/Time: 3/21/2020 8:04 AM



Reported By: Tiara Sorto 

Electronically Signed:  3/21/2020 9:08 AM

## 2020-03-21 NOTE — RAD
THREE VIEWS SACRUM AND COCCYX:



HISTORY: 

Pain. Fall.



FINDINGS: 

Uncomplicated bilateral transpedicular screws at L4, L5 and S1. No perihardware lucency. There appear
s to be grade 1 anterolisthesis of L5 upon S1. Visualized sacral alar preserved. Visualized bony

pelvis appears to be intact. There is severe degenerative change in the right hip joint space with lo
ss of joint space height, sclerosis and subchondral cyst formation.



IMPRESSION:

1. Uncomplicated fusion changes in the distal lumbar spine and lumbosacral junction.

2. No evidence of a lumbar spine fracture.

3. Severe degenerative changes in the right hip.



Transcribed Date/Time: 3/21/2020 8:13 AM



Reported By: Tiara Sorto 

Electronically Signed:  3/21/2020 9:06 AM

## 2020-03-21 NOTE — CT
PRELIMINARY REPORT/[facility]/EMERGENCY AFTER HOURS PROCEDURE:



EXAM: CT Head Without Intravenous Contrast. 



CLINICAL HISTORY: ER 2... 84F brought in via EMS c/o sacral & occipital pain s/p mechanical fall at a
ssisted living facility. 



TECHNIQUE: Axial computed tomography images of the head/brain without intravenous contrast. 



COMPARISON:  CT  - CT CERVICAL SPINE WO CON  - 03/21/2020 01:18 AM CDT 



FINDINGS: 

BRAIN: Mild cerebral volume loss and ex vacuo dilatation of the ventricles. Few scattered periventric
ular and subcortical white matter hypodensities. 

VENTRICLES: No hydrocephalus. 

ORBITS: The orbits are unremarkable. 

SINUSES AND MASTOIDS: The paranasal sinuses and mastoid air cells are clear. 

SOFT TISSUES: No significant facial or scalp soft tissue swelling evident. No radiopaque foreign body
 is seen. 

BONES: No acute skull fracture. 



IMPRESSION: 

1. No acute intracranial abnormality. 

2. Few scattered periventricular and subcortical white matter hypodensities.  These are nonspecific b
ut can be seen with chronic white matter microvascular ischemic changes.



ELECTRONICALLY SIGNED BY:

Patricio Cerrato DO

Mar 21, 2020 1:30:30 AM CDT





FINAL REPORT



HEAD CT WITHOUT CONTRAST:



HISTORY: 

Mechanical fall. Pain.



COMPARISON: 

3/5/2017.



FINDINGS:

Hemorrhage: No intraparenchymal hemorrhage or extra-axial hematoma.

Brain parenchyma: Cortical gray-white matter differentiation is preserved. No mass effect or midline 
shift. Basilar cisterns are patent.

Ventricular system: Ventricles and sulci are patent and symmetric.

Calvarium: Intact.

Sinuses and mastoid air cells: Adequate aeration.



IMPRESSION:



1. This report is in agreement with the preliminary report by [facility].

2. No intracranial posttraumatic sequelae.







Transcribed Date/Time: 3/21/2020 8:08 AM



Reported By: Tiara Sorto 

Electronically Signed:  3/21/2020 9:07 AM

## 2020-05-22 ENCOUNTER — HOSPITAL ENCOUNTER (OUTPATIENT)
Dept: HOSPITAL 92 - LABBT | Age: 85
Discharge: HOME | End: 2020-05-22
Attending: SURGERY
Payer: MEDICARE

## 2020-05-22 DIAGNOSIS — K40.90: ICD-10-CM

## 2020-05-22 DIAGNOSIS — Z01.818: Primary | ICD-10-CM

## 2020-05-22 LAB
ALBUMIN SERPL BCG-MCNC: 3.7 G/DL (ref 3.4–4.8)
ALP SERPL-CCNC: 61 U/L (ref 40–110)
ALT SERPL W P-5'-P-CCNC: 11 U/L (ref 8–55)
ANION GAP SERPL CALC-SCNC: 10 MMOL/L (ref 10–20)
AST SERPL-CCNC: 32 U/L (ref 5–34)
BASOPHILS # BLD AUTO: 0.1 THOU/UL (ref 0–0.2)
BASOPHILS NFR BLD AUTO: 1.1 % (ref 0–1)
BILIRUB SERPL-MCNC: 0.4 MG/DL (ref 0.2–1.2)
BUN SERPL-MCNC: 20 MG/DL (ref 9.8–20.1)
CALCIUM SERPL-MCNC: 9.6 MG/DL (ref 7.8–10.44)
CHLORIDE SERPL-SCNC: 102 MMOL/L (ref 98–107)
CO2 SERPL-SCNC: 27 MMOL/L (ref 23–31)
CREAT CL PREDICTED SERPL C-G-VRATE: 0 ML/MIN (ref 70–130)
EOSINOPHIL # BLD AUTO: 0.2 THOU/UL (ref 0–0.7)
EOSINOPHIL NFR BLD AUTO: 4.8 % (ref 0–10)
GLOBULIN SER CALC-MCNC: 3.7 G/DL (ref 2.4–3.5)
GLUCOSE SERPL-MCNC: 80 MG/DL (ref 83–110)
HGB BLD-MCNC: 11.4 G/DL (ref 12–16)
LYMPHOCYTES # BLD: 1.4 THOU/UL (ref 1.2–3.4)
LYMPHOCYTES NFR BLD AUTO: 28.5 % (ref 21–51)
MCH RBC QN AUTO: 30.7 PG (ref 27–31)
MCV RBC AUTO: 95 FL (ref 78–98)
MONOCYTES # BLD AUTO: 0.5 THOU/UL (ref 0.11–0.59)
MONOCYTES NFR BLD AUTO: 11.1 % (ref 0–10)
NEUTROPHILS # BLD AUTO: 2.6 THOU/UL (ref 1.4–6.5)
NEUTROPHILS NFR BLD AUTO: 54.5 % (ref 42–75)
PLATELET # BLD AUTO: 187 THOU/UL (ref 130–400)
POTASSIUM SERPL-SCNC: 3.8 MMOL/L (ref 3.5–5.1)
RBC # BLD AUTO: 3.72 MILL/UL (ref 4.2–5.4)
SODIUM SERPL-SCNC: 135 MMOL/L (ref 136–145)
WBC # BLD AUTO: 4.8 THOU/UL (ref 4.8–10.8)

## 2020-05-22 PROCEDURE — 85025 COMPLETE CBC W/AUTO DIFF WBC: CPT

## 2020-05-22 PROCEDURE — 93005 ELECTROCARDIOGRAM TRACING: CPT

## 2020-05-22 PROCEDURE — 93010 ELECTROCARDIOGRAM REPORT: CPT

## 2020-05-22 PROCEDURE — 80053 COMPREHEN METABOLIC PANEL: CPT

## 2020-05-29 NOTE — EKG
Test Reason : 

Blood Pressure : ***/*** mmHG

Vent. Rate : 054 BPM     Atrial Rate : 054 BPM

   P-R Int : 174 ms          QRS Dur : 092 ms

    QT Int : 464 ms       P-R-T Axes : 008 -44 019 degrees

   QTc Int : 440 ms

 

Sinus bradycardia

Left axis deviation

Incomplete right bundle branch block

Abnormal ECG

When compared with ECG of 05-JAN-2020 20:18,

No significant change was found

Confirmed by HERIBERTO ZAPATA ([id number]) on 5/29/2020 11:25:50 PM

 

Referred By:  JUDI           Confirmed By:HERIBERTO ZAPATA

## 2020-10-19 ENCOUNTER — HOSPITAL ENCOUNTER (EMERGENCY)
Dept: HOSPITAL 92 - ERS | Age: 85
Discharge: NURSING, REHAB OR LONG-TERM CARE | End: 2020-10-19
Payer: MEDICARE

## 2020-10-19 DIAGNOSIS — R42: Primary | ICD-10-CM

## 2020-10-19 DIAGNOSIS — F41.9: ICD-10-CM

## 2020-10-19 DIAGNOSIS — I10: ICD-10-CM

## 2020-10-19 DIAGNOSIS — E78.5: ICD-10-CM

## 2020-10-19 DIAGNOSIS — E03.9: ICD-10-CM

## 2020-10-19 LAB
ALBUMIN SERPL BCG-MCNC: 3.9 G/DL (ref 3.4–4.8)
ALP SERPL-CCNC: 77 U/L (ref 40–110)
ALT SERPL W P-5'-P-CCNC: 9 U/L (ref 8–55)
ANION GAP SERPL CALC-SCNC: 10 MMOL/L (ref 10–20)
AST SERPL-CCNC: 21 U/L (ref 5–34)
BACTERIA UR QL AUTO: (no result) HPF
BASOPHILS # BLD AUTO: 0.1 THOU/UL (ref 0–0.2)
BASOPHILS NFR BLD AUTO: 1 % (ref 0–1)
BILIRUB SERPL-MCNC: 0.4 MG/DL (ref 0.2–1.2)
BUN SERPL-MCNC: 18 MG/DL (ref 9.8–20.1)
CALCIUM SERPL-MCNC: 10.1 MG/DL (ref 7.8–10.44)
CHLORIDE SERPL-SCNC: 102 MMOL/L (ref 98–107)
CK SERPL-CCNC: 274 U/L (ref 29–168)
CO2 SERPL-SCNC: 28 MMOL/L (ref 23–31)
CREAT CL PREDICTED SERPL C-G-VRATE: 0 ML/MIN (ref 70–130)
EOSINOPHIL # BLD AUTO: 0.1 THOU/UL (ref 0–0.7)
EOSINOPHIL NFR BLD AUTO: 2.6 % (ref 0–10)
GLOBULIN SER CALC-MCNC: 3.3 G/DL (ref 2.4–3.5)
GLUCOSE SERPL-MCNC: 99 MG/DL (ref 83–110)
HGB BLD-MCNC: 11.2 G/DL (ref 12–16)
LEUKOCYTE ESTERASE UR QL STRIP.AUTO: 500 LEU/UL
LYMPHOCYTES # BLD: 0.7 THOU/UL (ref 1.2–3.4)
LYMPHOCYTES NFR BLD AUTO: 14.6 % (ref 21–51)
MCH RBC QN AUTO: 29.5 PG (ref 27–31)
MCV RBC AUTO: 91.4 FL (ref 78–98)
MONOCYTES # BLD AUTO: 0.6 THOU/UL (ref 0.11–0.59)
MONOCYTES NFR BLD AUTO: 12.3 % (ref 0–10)
NEUTROPHILS # BLD AUTO: 3.5 THOU/UL (ref 1.4–6.5)
NEUTROPHILS NFR BLD AUTO: 69.4 % (ref 42–75)
PLATELET # BLD AUTO: 218 THOU/UL (ref 130–400)
POTASSIUM SERPL-SCNC: 4.4 MMOL/L (ref 3.5–5.1)
RBC # BLD AUTO: 3.8 MILL/UL (ref 4.2–5.4)
SODIUM SERPL-SCNC: 136 MMOL/L (ref 136–145)
SP GR UR STRIP: 1.01 (ref 1–1.04)
WBC # BLD AUTO: 5 THOU/UL (ref 4.8–10.8)
WBC UR QL AUTO: (no result) HPF (ref 0–3)

## 2020-10-19 PROCEDURE — 81015 MICROSCOPIC EXAM OF URINE: CPT

## 2020-10-19 PROCEDURE — 71045 X-RAY EXAM CHEST 1 VIEW: CPT

## 2020-10-19 PROCEDURE — 70450 CT HEAD/BRAIN W/O DYE: CPT

## 2020-10-19 PROCEDURE — 84484 ASSAY OF TROPONIN QUANT: CPT

## 2020-10-19 PROCEDURE — 80053 COMPREHEN METABOLIC PANEL: CPT

## 2020-10-19 PROCEDURE — 82550 ASSAY OF CK (CPK): CPT

## 2020-10-19 PROCEDURE — 36415 COLL VENOUS BLD VENIPUNCTURE: CPT

## 2020-10-19 PROCEDURE — 85025 COMPLETE CBC W/AUTO DIFF WBC: CPT

## 2020-10-19 PROCEDURE — 93005 ELECTROCARDIOGRAM TRACING: CPT

## 2020-10-19 PROCEDURE — 81003 URINALYSIS AUTO W/O SCOPE: CPT

## 2020-10-19 NOTE — RAD
EXAM: Single view of the chest



HISTORY:   Dizziness



COMPARISON: None



FINDINGS: Single view of the chest shows a normal sized cardiomediastinal silhouette.  There are calc
ified left hilar lymph nodes. Calcified granulomas project over the left chest.  There is no

evidence of consolidation or pleural effusion. Hardware is seen in the lumbar spine. Degenerative carlos a
nges are seen in the thoracic spine.



IMPRESSION: No evidence of acute cardiopulmonary disease



Reported By: Miles Del Valle 

Electronically Signed:  10/19/2020 2:28 PM

## 2020-10-19 NOTE — CT
Exam: Head CT without contrast





HISTORY: Confusion. Hypertension.



COMPARISON: 3/21/2020





FINDINGS:

Hemorrhage: No intraparenchymal hemorrhage or extra-axial hematoma.



Brain parenchyma: Cortical gray-white matter differentiation is preserved. No mass effect or midline 
shift. Basilar cisterns are patent.

Ventricular system: Ventricles and sulci are patent and symmetric.

Calvarium: Intact.

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



IMPRESSION:

No acute intracranial process.









Reported By: Tiara Sorto 

Electronically Signed:  10/19/2020 2:49 PM

## 2020-12-16 ENCOUNTER — HOSPITAL ENCOUNTER (INPATIENT)
Dept: HOSPITAL 92 - ERS | Age: 85
LOS: 3 days | Discharge: HOME | DRG: 880 | End: 2020-12-19
Attending: HOSPITALIST | Admitting: FAMILY MEDICINE
Payer: MEDICARE

## 2020-12-16 VITALS — BODY MASS INDEX: 20.9 KG/M2

## 2020-12-16 DIAGNOSIS — E78.00: ICD-10-CM

## 2020-12-16 DIAGNOSIS — I16.0: ICD-10-CM

## 2020-12-16 DIAGNOSIS — R07.9: ICD-10-CM

## 2020-12-16 DIAGNOSIS — Z88.2: ICD-10-CM

## 2020-12-16 DIAGNOSIS — M16.11: ICD-10-CM

## 2020-12-16 DIAGNOSIS — G89.29: ICD-10-CM

## 2020-12-16 DIAGNOSIS — Z79.01: ICD-10-CM

## 2020-12-16 DIAGNOSIS — Z88.6: ICD-10-CM

## 2020-12-16 DIAGNOSIS — F41.9: Primary | ICD-10-CM

## 2020-12-16 DIAGNOSIS — Z90.710: ICD-10-CM

## 2020-12-16 DIAGNOSIS — Z79.899: ICD-10-CM

## 2020-12-16 DIAGNOSIS — N28.1: ICD-10-CM

## 2020-12-16 DIAGNOSIS — E03.9: ICD-10-CM

## 2020-12-16 DIAGNOSIS — I10: ICD-10-CM

## 2020-12-16 DIAGNOSIS — Z88.8: ICD-10-CM

## 2020-12-16 DIAGNOSIS — R55: ICD-10-CM

## 2020-12-16 DIAGNOSIS — Z88.1: ICD-10-CM

## 2020-12-16 LAB
ALBUMIN SERPL BCG-MCNC: 3.6 G/DL (ref 3.4–4.8)
ALP SERPL-CCNC: 82 U/L (ref 40–110)
ALT SERPL W P-5'-P-CCNC: 11 U/L (ref 8–55)
ANION GAP SERPL CALC-SCNC: 13 MMOL/L (ref 10–20)
AST SERPL-CCNC: 24 U/L (ref 5–34)
BASOPHILS # BLD AUTO: 0.1 THOU/UL (ref 0–0.2)
BASOPHILS NFR BLD AUTO: 1 % (ref 0–1)
BILIRUB SERPL-MCNC: 0.3 MG/DL (ref 0.2–1.2)
BUN SERPL-MCNC: 20 MG/DL (ref 9.8–20.1)
CALCIUM SERPL-MCNC: 9.7 MG/DL (ref 7.8–10.44)
CHLORIDE SERPL-SCNC: 104 MMOL/L (ref 98–107)
CK SERPL-CCNC: 213 U/L (ref 29–168)
CO2 SERPL-SCNC: 24 MMOL/L (ref 23–31)
CREAT CL PREDICTED SERPL C-G-VRATE: 0 ML/MIN (ref 70–130)
EOSINOPHIL # BLD AUTO: 0.3 THOU/UL (ref 0–0.7)
EOSINOPHIL NFR BLD AUTO: 5.5 % (ref 0–10)
GLOBULIN SER CALC-MCNC: 3.3 G/DL (ref 2.4–3.5)
GLUCOSE SERPL-MCNC: 127 MG/DL (ref 83–110)
HGB BLD-MCNC: 12.3 G/DL (ref 12–16)
LIPASE SERPL-CCNC: 33 U/L (ref 8–78)
LYMPHOCYTES # BLD: 1.2 THOU/UL (ref 1.2–3.4)
LYMPHOCYTES NFR BLD AUTO: 22.5 % (ref 21–51)
MCH RBC QN AUTO: 31.3 PG (ref 27–31)
MCV RBC AUTO: 92.5 FL (ref 78–98)
MONOCYTES # BLD AUTO: 0.5 THOU/UL (ref 0.11–0.59)
MONOCYTES NFR BLD AUTO: 9.5 % (ref 0–10)
NEUTROPHILS # BLD AUTO: 3.3 THOU/UL (ref 1.4–6.5)
NEUTROPHILS NFR BLD AUTO: 61.5 % (ref 42–75)
PLATELET # BLD AUTO: 208 THOU/UL (ref 130–400)
POTASSIUM SERPL-SCNC: 4.1 MMOL/L (ref 3.5–5.1)
RBC # BLD AUTO: 3.92 MILL/UL (ref 4.2–5.4)
SODIUM SERPL-SCNC: 137 MMOL/L (ref 136–145)
SP GR UR STRIP: 1.01 (ref 1–1.04)
TROPONIN I SERPL DL<=0.01 NG/ML-MCNC: (no result) NG/ML (ref ?–0.03)
TROPONIN I SERPL DL<=0.01 NG/ML-MCNC: 0.01 NG/ML (ref ?–0.03)
WBC # BLD AUTO: 5.4 THOU/UL (ref 4.8–10.8)

## 2020-12-16 PROCEDURE — 93005 ELECTROCARDIOGRAM TRACING: CPT

## 2020-12-16 PROCEDURE — 84484 ASSAY OF TROPONIN QUANT: CPT

## 2020-12-16 PROCEDURE — 87635 SARS-COV-2 COVID-19 AMP PRB: CPT

## 2020-12-16 PROCEDURE — G0378 HOSPITAL OBSERVATION PER HR: HCPCS

## 2020-12-16 PROCEDURE — 83735 ASSAY OF MAGNESIUM: CPT

## 2020-12-16 PROCEDURE — 93017 CV STRESS TEST TRACING ONLY: CPT

## 2020-12-16 PROCEDURE — 71045 X-RAY EXAM CHEST 1 VIEW: CPT

## 2020-12-16 PROCEDURE — 51701 INSERT BLADDER CATHETER: CPT

## 2020-12-16 PROCEDURE — U0003 INFECTIOUS AGENT DETECTION BY NUCLEIC ACID (DNA OR RNA); SEVERE ACUTE RESPIRATORY SYNDROME CORONAVIRUS 2 (SARS-COV-2) (CORONAVIRUS DISEASE [COVID-19]), AMPLIFIED PROBE TECHNIQUE, MAKING USE OF HIGH THROUGHPUT TECHNOLOGIES AS DESCRIBED BY CMS-2020-01-R: HCPCS

## 2020-12-16 PROCEDURE — 80053 COMPREHEN METABOLIC PANEL: CPT

## 2020-12-16 PROCEDURE — 80061 LIPID PANEL: CPT

## 2020-12-16 PROCEDURE — 83690 ASSAY OF LIPASE: CPT

## 2020-12-16 PROCEDURE — 85025 COMPLETE CBC W/AUTO DIFF WBC: CPT

## 2020-12-16 PROCEDURE — 82550 ASSAY OF CK (CPK): CPT

## 2020-12-16 PROCEDURE — 74177 CT ABD & PELVIS W/CONTRAST: CPT

## 2020-12-16 PROCEDURE — 84443 ASSAY THYROID STIM HORMONE: CPT

## 2020-12-16 PROCEDURE — 78452 HT MUSCLE IMAGE SPECT MULT: CPT

## 2020-12-16 PROCEDURE — 94760 N-INVAS EAR/PLS OXIMETRY 1: CPT

## 2020-12-16 PROCEDURE — 96376 TX/PRO/DX INJ SAME DRUG ADON: CPT

## 2020-12-16 PROCEDURE — 96375 TX/PRO/DX INJ NEW DRUG ADDON: CPT

## 2020-12-16 PROCEDURE — 83880 ASSAY OF NATRIURETIC PEPTIDE: CPT

## 2020-12-16 PROCEDURE — A9500 TC99M SESTAMIBI: HCPCS

## 2020-12-16 PROCEDURE — 36416 COLLJ CAPILLARY BLOOD SPEC: CPT

## 2020-12-16 PROCEDURE — 96374 THER/PROPH/DIAG INJ IV PUSH: CPT

## 2020-12-16 PROCEDURE — 36415 COLL VENOUS BLD VENIPUNCTURE: CPT

## 2020-12-16 PROCEDURE — 81003 URINALYSIS AUTO W/O SCOPE: CPT

## 2020-12-16 PROCEDURE — 85379 FIBRIN DEGRADATION QUANT: CPT

## 2020-12-16 PROCEDURE — 93306 TTE W/DOPPLER COMPLETE: CPT

## 2020-12-16 RX ADMIN — HYDROCODONE BITARTRATE AND ACETAMINOPHEN PRN TAB: 5; 325 TABLET ORAL at 22:39

## 2020-12-16 NOTE — PDOC.HHP
Hospitalist HPI





- History of Present Illness


History of Present Illness: 





ADMISSION DATE: 12/16/2020


TIME OF ASSESSMENT: 1130





PRIMARY CARE PHYSICIAN: Rajani





CHIEF COMPLAINT: Almost passed out, chest pain





HPI: Patient is a 85-year-old female with past medical history significant for 

hyperlipidemia, hypertension, hypothyroidism, chronic pain.  She is currently 

living at [facility] and still able to perform all of her tasks 

independently.  She states that yesterday she was overall not feeling well and 

felt really tired.  She went to bed last night and was a little nauseated and 

shaky and felt that her heart was beating really fast.  This morning she woke up

at 5 AM and stated that she felt her heart rate was going fast again and she was

diaphoretic.  She states when she went to stand up she almost fainted and fell 

back onto her bed, no injuries reported.  She was able to ambulate to the 

restroom and back to bed with her walker.  Patient states that she took her 

blood pressure medication at that time to see if it would help her feel better. 

She did not check her blood pressure or pulse before taking the medication as 

she was told that her machine is probably inaccurate.  Patient did have some in

termittent chest pain yesterday also although none reported today.  She 

describes it as midsternal pressure that resolved on its own.  After some time 

this morning she did feel a little better but was still overall feeling weak and

faint so she called EMS to bring her to the hospital.  Patient has had multiple 

stressors including family issues and has had 2 friends recently pass away from 

Covid.  She states she is normally a very social person but the pandemic has 

restricted her.  She does suffer from chronic pain from her right hip that 

radiates across her stomach and even into her vaginal area.  She is scheduled 

for surgery January 26.





ED COURSE:


Vital Signs: Blood pressure 181/82, pulse 82, respiratory rate 16, temp 98.2, 

90% room air


Today in the ER they completed a UA, CT of the abdomen and pelvis with IV 

contrast, chest x-ray, EKG, lab work.  She was administered Zofran 4 mg IV.





PAST MEDICAL HISTORY: Hyperlipidemia, hypertension, hypothyroid, chronic pain, 

anxiety





PAST SURGICAL HISTORY: Back surgery x3, hysterectomy, right shoulder surgery





SOCIAL HISTORY: Patient lives independently at [facility].  She uses a 

walker to ambulate.  She denies any alcohol, drug, tobacco use.





FAMILY HISTORY: Mother had rheumatic fever as a child and passed from an MI at 

age 46.  Strong family history of strokes.





ALLERGIES: Ambien, Cipro, codeine, morphine, sulfa





CURRENT MEDICATIONS:


Metoprolol 50 mg 2 times a day


Foss Thyroid 120 mg once a day


Simvastatin 10 mg once a day


Xanax 2 mg once a day at bedtime








Hospitalist ROS





- Review of Systems


Constitutional: reports: weakness


Cardiovascular: reports: chest pain, palpitations, light headedness


Gastrointestinal: reports: abdominal pain


All other systems reviewed; all pertinent +/- noted in HPI/Subj





- Exam


General Appearance: NAD, awake alert


Eye: PERRL


ENT: normocephalic atraumatic


Neck: supple


Heart: RRR, no murmur, no gallops, no rubs, normal peripheral pulses


Respiratory: CTAB, no wheezes, no rales, no ronchi, normal chest expansion


Gastrointestinal: soft, non-distended, normal bowel sounds, tender to palpation


Extremities: no edema


Skin: no rashes


Neurological: no focal deficits


Psychiatric: A&O x 3


Psychiatric - other findings: Tearful at times during interview





Hospitalist Results





- Labs


Result Diagrams: 


                                 12/16/20 07:59





                                 12/16/20 07:59


Lab results: 


                                        











WBC  5.4 thou/uL (4.8-10.8)   12/16/20  07:59    


 


Hgb  12.3 g/dL (12.0-16.0)   12/16/20  07:59    


 


Hct  36.3 % (36.0-47.0)   12/16/20  07:59    


 


MCV  92.5 fL (78.0-98.0)   12/16/20  07:59    


 


Plt Count  208 thou/uL (130-400)   12/16/20  07:59    


 


Neutrophils %  61.5 % (42.0-75.0)   12/16/20  07:59    


 


Sodium  137 mmol/L (136-145)   12/16/20  07:59    


 


Potassium  4.1 mmol/L (3.5-5.1)   12/16/20  07:59    


 


Chloride  104 mmol/L ()   12/16/20  07:59    


 


Carbon Dioxide  24 mmol/L (23-31)   12/16/20  07:59    


 


BUN  20 mg/dL (9.8-20.1)   12/16/20  07:59    


 


Creatinine  0.76 mg/dL (0.6-1.1)   12/16/20  07:59    


 


Glucose  127 mg/dL ()  H  12/16/20  07:59    


 


Calcium  9.7 mg/dL (7.8-10.44)   12/16/20  07:59    


 


Total Bilirubin  0.3 mg/dL (0.2-1.2)   12/16/20  07:59    


 


AST  24 U/L (5-34)   12/16/20  07:59    


 


ALT  11 U/L (8-55)   12/16/20  07:59    


 


Alkaline Phosphatase  82 U/L ()   12/16/20  07:59    


 


Creatine Kinase  213 U/L ()  H  12/16/20  07:59    


 


Troponin I  Less than  0.010 ng/mL (< 0.028)   12/16/20  07:59    


 


Serum Total Protein  6.9 g/dL (6.0-8.3)   12/16/20  07:59    


 


Albumin  3.6 g/dL (3.4-4.8)   12/16/20  07:59    


 


Lipase  33 U/L (8-78)   12/16/20  07:59    


 


Urine Ketones  Negative mg/dL (Negative)   12/16/20  08:49    


 


Urine Blood  Negative  (Negative)   12/16/20  08:49    


 


Urine Nitrite  Negative  (Negative)   12/16/20  08:49    


 


Ur Leukocyte Esterase  Negative Rodolfo/uL (Negative)   12/16/20  08:49    














- EKG Interpretation


EKG: 





Sinus rhythm 98 bpm





- Radiology Interpretation


  ** CT scan - abdomen


Status: image reviewed by me, report reviewed by me


Additional Comment: 


COMPARISON: 


5/1/2020 and 9/13/2019. 





Technique: 


Multiple contiguous axial CT images are obtained through the abdomen and pelvis 

with IV contrast. Coronal reformatted images are provided. 





FINDINGS: 


Lower Chest: The heart is borderline enlarged. Calcified granulomata is present 

at each lung base. There are multiple tiny subcentimeter pulmonary nodules in 

the right lower lobe. Approximately 7 mm noncalcified pulmonary nodule seen at 

the medial aspect left posterolateral costophrenic angle with a


stable 8 mm noncalcified pulmonary nodule seen in the left lower lobe with a few

adjacent smaller pulmonary nodules also seen. These pulmonary nodules were 

present on studies on 5/1/2020 and 2019 and also appear to be present on the 

study in 2015. 





Vessels: Vascular calcifications are seen in region of the mitral valve annulus 

and the level of the 


aortic valve. Vascular calcifications are seen in the aorta as well as involving

the iliac 


arteries. 





Abdomen: 


Portal vein:Patent 


Gallbladder: Within normal limits for CT imaging. 


Liver: Few hepatic granulomata visualized. 


Spleen: within normal limits. 


Pancreas: within normal limits. 


Adrenals: within normal limits. 


Kidneys: Few subcentimeter too small to characterize hypodense lesions are again

seen in the midportion right kidney. Large left renal cyst occupying the 

majority of the superior pole left kidney is again seen measuring 7.5 cm in 

maximal dimensions. This previously measured 7 cm in maximal dimensions. Again 

noted is the exophytic low-density lesion measuring 1.2 cm and the posterior 

aspect midportion left kidney which again exhibits peripheral calcification. 

This complex lesion was also seen on a prior examination on 7/15/2015 and 

similar in size to that study. 





Bowel: Small amount retained fecal material seen throughout the colon. Loops of 

small bowel are normal in caliber. 


Appendix: Not visualized, there are no secondary signs seen to suggest 

appendicitis. 





Peritoneum: No ascites or free air; no fluid collection. 


Mesentery and Retroperitoneum: No enlarged mesenteric or retroperitoneal lymph 

nodes. 


Abdominal Wall: There is mild stranding seen within the region of the right 

inguinal canal. This could be related to prior hernia repair. 





Pelvis: 


Reproductive Organs: Evidence of hysterectomy. 


Bladder: within normal limits. 





Bones: There is severe right hip osteoarthritis with flattening of the humeral 

head and large subchondral cystic changes as well as subchondral sclerosis and 

severe joint space narrowing present. There is also prominent capsular 

distention involving the right hip. 





Postoperative changes lower lumbar spine are present. Degenerative change are 

also seen in the spine with right convex scoliosis lumbar spine. 





IMPRESSION: 


1. No acute findings in the abdomen or pelvis. 


2. Severe right hip osteoarthritis with joint effusion/prominent capsular 

distention right hip. 


3. Interval enlargement of a large left renal cyst with overall stable complex 

peripherally calcified cystic lesion midportion left kidney. 


4. Bibasilar pulmonary nodules which were seen on prior studies. 


5. Additional findings as described above. 





  ** Chest x-ray


Status: image reviewed by me, report reviewed by me


Additional Comment: 





IMPRESSION: 


Atherosclerosis. No acute cardiopulmonary process. 





Hospitalist H&P A/P





- Plan


Plan: 





Near syncope


Echo ordered


Continue to monitor on telemetry


Obtain orthostatic vitals





Chest pain


Continue to trend troponins


Stress test ordered





Hypertension


Continue home medications


Monitor every 4 hours


As needed antihypertensives available





Chronic pain


As needed analgesics available, will continue which she takes at home





CODE STATUS: Full


Surrogate decision-maker would be a family friend, [facility] [phone]

## 2020-12-16 NOTE — CT
CT ABDOMEN AND PELVIS WITH IV CONTRAST

 12/16/2020



CLINICAL INFORMATION:

Right-sided abdominal pain for several days. Episode of chest pain is well.



COMPARISON:

 5/1/2020 and 9/13/2019.



Technique:

Multiple contiguous axial CT images are obtained through the abdomen and pelvis with IV contrast. Cor
onal reformatted images are provided.



FINDINGS:



Lower Chest: The heart is borderline enlarged. Calcified granulomata is present at each lung base. Th
ere are multiple tiny subcentimeter pulmonary nodules in the right lower lobe. Approximately 7 mm

noncalcified pulmonary nodule seen at the medial aspect left posterolateral costophrenic angle with a
 stable 8 mm noncalcified pulmonary nodule seen in the left lower lobe with a few adjacent smaller

pulmonary nodules also seen. These pulmonary nodules were present on studies on 5/1/2020 and 2019 and
 also appear to be present on the study in 2015.



Vessels: Vascular calcifications are seen in region of the mitral valve annulus and the level of the 
aortic valve. Vascular calcifications are seen in the aorta as well as involving the iliac

arteries.



Abdomen:

Portal vein:Patent

Gallbladder: Within normal limits for CT imaging.

Liver: Few hepatic granulomata visualized.

Spleen: within normal limits.

Pancreas: within normal limits.

Adrenals: within normal limits.

Kidneys: Few subcentimeter too small to characterize hypodense lesions are again seen in the midporti
on right kidney. Large left renal cyst occupying the majority of the superior pole left kidney is

again seen measuring 7.5 cm in maximal dimensions. This previously measured 7 cm in maximal dimension
s. Again noted is the exophytic low-density lesion measuring 1.2 cm and the posterior aspect

midportion left kidney which again exhibits peripheral calcification. This complex lesion was also se
en on a prior examination on 7/15/2015 and similar in size to that study.



Bowel: Small amount retained fecal material seen throughout the colon. Loops of small bowel are lalo
l in caliber.

Appendix: Not visualized, there are no secondary signs seen to suggest appendicitis.



Peritoneum: No ascites or free air; no fluid collection.

Mesentery and Retroperitoneum: No enlarged mesenteric or retroperitoneal lymph nodes.

Abdominal Wall: There is mild stranding seen within the region of the right inguinal canal. This coul
d be related to prior hernia repair.



Pelvis:

Reproductive Organs: Evidence of hysterectomy.

Bladder: within normal limits.



Bones: There is severe right hip osteoarthritis with flattening of the humeral head and large subchon
dral cystic changes as well as subchondral sclerosis and severe joint space narrowing present.

There is also prominent capsular distention involving the right hip.



Postoperative changes lower lumbar spine are present. Degenerative change are also seen in the spine 
with right convex scoliosis lumbar spine.  



IMPRESSION:



1. No acute findings in the abdomen or pelvis.

2. Severe right hip osteoarthritis with joint effusion/prominent capsular distention right hip.

3. Interval enlargement of a large left renal cyst with overall stable complex peripherally calcified
 cystic lesion midportion left kidney.

4. Bibasilar pulmonary nodules which were seen on prior studies.

5. Additional findings as described above.



Reported By: Alphonso Hillman 

Electronically Signed:  12/16/2020 9:44 AM

## 2020-12-16 NOTE — RAD
CHEST 1 VIEW:

 

Date:  12/16/2020

 

HISTORY:  

Feeling faint. 

 

COMPARISON:  

10/19/2020. 

 

FINDINGS:

Atherosclerosis and elongation of aorta. Normal cardiac silhouette. Pulmonary vessels and hilum are n
ormal. Chronic changes without mass or consolidation. No pneumothorax or acute osseous abnormalities.
 

 

IMPRESSION: 

Atherosclerosis. No acute cardiopulmonary process. 

 

 

POS: [facility]

## 2020-12-17 LAB
ALBUMIN SERPL BCG-MCNC: 3.8 G/DL (ref 3.4–4.8)
ALP SERPL-CCNC: 72 U/L (ref 40–110)
ALT SERPL W P-5'-P-CCNC: 10 U/L (ref 8–55)
ANION GAP SERPL CALC-SCNC: 9 MMOL/L (ref 10–20)
AST SERPL-CCNC: 20 U/L (ref 5–34)
BASOPHILS # BLD AUTO: 0.1 THOU/UL (ref 0–0.2)
BASOPHILS NFR BLD AUTO: 0.9 % (ref 0–1)
BILIRUB SERPL-MCNC: 0.4 MG/DL (ref 0.2–1.2)
BUN SERPL-MCNC: 16 MG/DL (ref 9.8–20.1)
CALCIUM SERPL-MCNC: 10.3 MG/DL (ref 7.8–10.44)
CHD RISK SERPL-RTO: 3 (ref ?–4.5)
CHLORIDE SERPL-SCNC: 103 MMOL/L (ref 98–107)
CHOLEST SERPL-MCNC: 183 MG/DL
CO2 SERPL-SCNC: 32 MMOL/L (ref 23–31)
CREAT CL PREDICTED SERPL C-G-VRATE: 43 ML/MIN (ref 70–130)
EOSINOPHIL # BLD AUTO: 0.4 THOU/UL (ref 0–0.7)
EOSINOPHIL NFR BLD AUTO: 8 % (ref 0–10)
GLOBULIN SER CALC-MCNC: 3.6 G/DL (ref 2.4–3.5)
GLUCOSE SERPL-MCNC: 99 MG/DL (ref 83–110)
HDLC SERPL-MCNC: 62 MG/DL
HGB BLD-MCNC: 13.1 G/DL (ref 12–16)
LDLC SERPL CALC-MCNC: 105 MG/DL
LYMPHOCYTES # BLD: 1.2 THOU/UL (ref 1.2–3.4)
LYMPHOCYTES NFR BLD AUTO: 21.5 % (ref 21–51)
MAGNESIUM SERPL-MCNC: 1.9 MG/DL (ref 1.6–2.6)
MCH RBC QN AUTO: 30.4 PG (ref 27–31)
MCV RBC AUTO: 92.6 FL (ref 78–98)
MONOCYTES # BLD AUTO: 0.5 THOU/UL (ref 0.11–0.59)
MONOCYTES NFR BLD AUTO: 9.2 % (ref 0–10)
NEUTROPHILS # BLD AUTO: 3.3 THOU/UL (ref 1.4–6.5)
NEUTROPHILS NFR BLD AUTO: 60.4 % (ref 42–75)
PLATELET # BLD AUTO: 213 THOU/UL (ref 130–400)
POTASSIUM SERPL-SCNC: 4.5 MMOL/L (ref 3.5–5.1)
RBC # BLD AUTO: 4.33 MILL/UL (ref 4.2–5.4)
SODIUM SERPL-SCNC: 139 MMOL/L (ref 136–145)
TRIGL SERPL-MCNC: 79 MG/DL (ref ?–150)
WBC # BLD AUTO: 5.4 THOU/UL (ref 4.8–10.8)

## 2020-12-17 RX ADMIN — HYDROCODONE BITARTRATE AND ACETAMINOPHEN PRN TAB: 5; 325 TABLET ORAL at 10:00

## 2020-12-17 RX ADMIN — HYDROCODONE BITARTRATE AND ACETAMINOPHEN PRN TAB: 5; 325 TABLET ORAL at 05:43

## 2020-12-17 RX ADMIN — HYDROCODONE BITARTRATE AND ACETAMINOPHEN PRN TAB: 5; 325 TABLET ORAL at 18:07

## 2020-12-17 RX ADMIN — ASPIRIN SCH: 81 TABLET ORAL at 18:07

## 2020-12-17 RX ADMIN — HYDROCODONE BITARTRATE AND ACETAMINOPHEN PRN TAB: 5; 325 TABLET ORAL at 00:40

## 2020-12-17 NOTE — PDOC.HOSPP
- Subjective


Encounter Date: 12/17/20


Encounter Time: 10:04


Subjective: 


Patient complaining of pain in left hip which she states is chronic.  She takes 

hydrocodone at home and this was given 5 hours ago.  She also reports feeling 

anxious about not eating.  She has been fasting for stress test and it has not 

been done yet this morning.  Otherwise she feels well.  Has not had any 

lightheadedness or dizziness.  Reports discomfort to left side of posterior 

chest, no central chest pain or palpitations.  Denies any sob.  Reports mild 

lower abdominal discomfort which she states is chronic and radiating from right 

hip pain (awaiting right hip replacement) but denies any dysuria, hesitancy, 

urgency or hematuria.  Feels she is emptying her bladder fully.  





- Objective


Vital Signs & Weight: 


                             Vital Signs (12 hours)











  Temp Pulse Resp BP Pulse Ox


 


 12/17/20 07:35  97.7 F  70  18  191/79 H  98


 


 12/17/20 05:36  97.9 F  74  16  180/77 H  96








                                     Weight











Weight                         120 lb














I&O: 


                                        











 12/16/20 12/17/20 12/18/20





 06:59 06:59 06:59


 


Intake Total  120 


 


Output Total  700 


 


Balance  -580 











Result Diagrams: 


                                 12/17/20 10:17





                                 12/17/20 10:17





Hospitalist ROS





- Review of Systems


Constitutional: denies: fever, chills, sweats, weakness, malaise, other


Eyes: denies: pain, vision change, conjunctivae inflammation, eyelid 

inflammation, redness, other


ENT: denies: ear pain, ear discharge, nose pain, nose discharge, nose 

congestion, mouth pain, mouth swelling, throat pain, throat swelling, other


Respiratory: denies: cough, dry, shortness of breath, hemoptysis, SOB with 

excertion, pleuritic pain, sputum, wheezing, other


Cardiovascular: denies: chest pain, palpitations, orthopnea, paroxysmal noc. 

dyspnea, edema, light headedness, other


Gastrointestinal: reports: abdominal pain (lower abdomen radiating from right 

hip per patient).  denies: nausea, vomiting, diarrhea, constipation, melena, 

hematochezia, other


Genitourinary: denies: dysuria, frequency, incontinence, hematuria, retention, 

other


Musculoskeletal: reports: other (right hip/lower back pain).  denies: neck pain,

shoulder pain, arm pain, back pain, hand pain, leg pain, foot pain


Skin: denies: rash, lesions, haresh, bruising, other


Neurological: denies: weakness, numbness, incoordination, change in speech, 

confusion, seizures, other





- Medication


Medications: 


Active Medications











Generic Name Dose Route Start Last Admin





  Trade Name Freq  PRN Reason Stop Dose Admin


 


Hydrocodone Bitart/Acetaminophen  1 tab  12/16/20 12:57  12/17/20 05:43





  Hydrocodone/Acetaminophen 5/325 Mg Tablet  PO   1 tab





  Q4H PRN   Administration





  Pain  


 


Atorvastatin Calcium  10 mg  12/16/20 21:00  12/16/20 20:27





  Atorvastatin Calcium 10 Mg Tab  PO   10 mg





  HS JOHN   Administration


 


Metoprolol Tartrate  50 mg  12/16/20 21:00  12/16/20 20:27





  Metoprolol Tartrate 50 Mg Tab  PO   50 mg





  BID JOHN   Administration


 


Thyroid  30 mg  12/17/20 06:00  12/17/20 05:36





  Thyroid 30 Mg Tab  PO   30 mg





  0600 JOHN   Administration














- Exam


General Appearance: NAD, awake alert


Eye: PERRL, anicteric sclera


ENT: normocephalic atraumatic, no oropharyngeal lesions


Neck: supple, no lymphadenopathy


Heart: RRR, no murmur, no gallops, no rubs


Respiratory: CTAB, no wheezes, no rales, no ronchi, normal chest expansion


Gastrointestinal: soft, non-tender, non-distended, normal bowel sounds


Extremities: no edema


Skin: normal turgor, no lesions, no rashes


Neurological: cranial nerve grossly intact, normal sensation to touch


Musculoskeletal: normal tone, normal strength, no muscle wasting


Psychiatric: normal affect, normal behavior, A&O x 3





Hosp A/P


(1) Near syncope


Status: Acute   


Plan: 


Awaiting Echo


No further episodes


Orthostatic BPs








(2) Chest pain


Code(s): R07.9 - CHEST PAIN, UNSPECIFIED   Status: Resolved   


Plan: 


Troponins negative x 3


Awaiting stress test


Repeat labs including Mg+, BNP and D-Dimer 


Continue cardiac monitoring











(3) Hypertension


Code(s): I10 - ESSENTIAL (PRIMARY) HYPERTENSION   Status: Chronic   


Plan: 


Monitor BP


Continue anti-hypertensives


Patient anxious, likely driving up BP


If maintains elevated consider adding low dose amlodipine. 








(4) Right hip pain


Code(s): M25.551 - PAIN IN RIGHT HIP   Status: Chronic   


Plan: 


Continue Norco 








- Plan


out of bed/ambulate, DVT proph w/SCDs, GI proph





Discharge pending above work-up

## 2020-12-17 NOTE — NM
CARDIAC SPECT:

 

CLINICAL HISTORY:

85-year-old female with chest pain, hypertension, dyslipidemia. 

 

TECHNIQUE:  

A myocardial perfusion scan was performed using the single isotope two day protocol with 27 mCi techn
etium-99m sestamibi injected intravenously for both stress and rest images. Pharmacologic stress with
 Adenosine was monitored and interpreted by CHARLES Cummings NP. 

 

FINDINGS:

Fairly homogeneous tracer distribution is seen in the myocardial segments on stress and rest images w
ithout fixed or reversible defects. 

 

GATED SPECT LVEF:

80%. 

 

WALL MOTION EXAM:

Normal. 

 

IMPRESSION: 

Normal myocardial perfusion scan. 

  

 

 

POS: [facility]

## 2020-12-18 RX ADMIN — HYDROCODONE BITARTRATE AND ACETAMINOPHEN PRN TAB: 5; 325 TABLET ORAL at 20:12

## 2020-12-18 RX ADMIN — HYDROCODONE BITARTRATE AND ACETAMINOPHEN PRN TAB: 5; 325 TABLET ORAL at 06:05

## 2020-12-18 RX ADMIN — ASPIRIN SCH MG: 81 TABLET ORAL at 08:58

## 2020-12-18 RX ADMIN — Medication SCH: at 20:11

## 2020-12-18 RX ADMIN — HYDROCODONE BITARTRATE AND ACETAMINOPHEN PRN TAB: 5; 325 TABLET ORAL at 00:32

## 2020-12-18 RX ADMIN — HYDROCODONE BITARTRATE AND ACETAMINOPHEN PRN TAB: 5; 325 TABLET ORAL at 11:27

## 2020-12-18 NOTE — PDOC.DS.DS
Provider





- Provider


Date of Admission: 


12/18/20 11:12





Admitting Provider: 


Everardo Malagon DO





Primary Care Physician: 


Abhay Rosales Jr, MD








Course





- Hospital Course


Hospital Course: 





85-year-old female presented with


Pre syncope





-Sinus rhythm echo showed EF of 70% and diastolic dysfunction.


Stress test negative.








(2) Chest pain


Code(s): R07.9 - CHEST PAIN, UNSPECIFIED   Status: Resolved   


Plan: 


Troponins negative x 3











(3) Hypertension


Code(s): I10 - ESSENTIAL (PRIMARY) HYPERTENSION   Status: Chronic   


It seems she has underlying suboptimally controlled blood pressure.


Norvasc added.











(4) Right hip pain


Code(s): M25.551 - PAIN IN RIGHT HIP   Status: Chronic   


Plan: 


Continue Norco 


She is quite anxious to go home.  Discussed about following with the blood 

pressure.At home she does have a blood pressure cuff. 


 Also adding Norvasc to her regimen.  Her latest blood pressure seems to be 

going up.  If it improved, that is systolic blood pressure less than 160,  then 

possible discharge home today with close follow-up with the primary care 

physician.











Resuscitation Status: 








12/16/20 13:01


Resuscitation Status Routine 


   Co-Sign Provider: 


   Resuscitation Status: FULL: Full Resuscitation


   Discussed with: pt











- Labs


Lab Results: 


                                        





                                 12/17/20 10:17 





                                 12/17/20 10:17 





Abnormal Lab Results - Last 48 hrs





12/17/20 10:17: Carbon Dioxide 32 H, Anion Gap 9 L, Globulin 3.6 H, 

Albumin/Globulin Ratio 1.1 L


12/17/20 10:17: MPV 6.9 L


12/17/20 10:29: D-Dimer 1.39 H











- Physical Exam


Vitals: 


                             Vital Signs (12 hours)











  Temp Pulse Resp BP BP BP BP


 


 12/18/20 11:30  97.4 F L  68  18  201/84 H   


 


 12/18/20 07:34  97.9 F  73  16  151/68 H   


 


 12/18/20 06:43   72     


 


 12/18/20 05:00  97.4 F L  80  18   204/85 H  185/88 H  204/87 H














  Pulse Ox


 


 12/18/20 11:30  98


 


 12/18/20 07:34  96


 


 12/18/20 06:43 


 


 12/18/20 05:00  97








                                     Weight











Weight                         118 lb 5 oz














Physical Exam: 


The patient was seen and examined on the day of discharge.


Patient is resting.  She is quite anxious to go home.  Discussed about following

with the blood pressure.At home she does have a blood pressure cuff. 


 Also adding Norvasc to her regimen.  Her latest blood pressure seems to be 

going up.  If it improved then possible discharge home today with close follow-

up with the primary care physician.








Plan





- Discharge Medications


Prescriptions: 


amLODIPine Besylate [Norvasc] 5 mg PO BID 30 Days #60 tablet


Home Medications: 


                                        











 Medication  Instructions  Recorded  Confirmed  Type


 


ALPRAZolam [Alprazolam] 2 mg PO HS 08/06/15 12/16/20 History


 


Metoprolol Tartrate [Lopressor] 1 tab PO BID 04/22/19 12/16/20 History


 


Aspirin [Ecotrin Low Strength] 81 mg PO DAILY 12/16/20 12/16/20 History


 


Docusate [Colace] 100 mg PO DAILY 12/16/20 12/16/20 History


 


HYDROcodone/Acetaminophen [Norco 0.5 each PO QID 12/16/20 12/16/20 History





7.5-325 Tablet]    


 


Simvastatin [Zocor] 20 mg PO HS 12/16/20 12/16/20 History


 


Thyroid,Pork [[place] Thyroid] 30 mg PO DAILY 12/16/20 12/16/20 History


 


amLODIPine Besylate [Norvasc] 5 mg PO BID 30 Days #60 tablet 12/18/20  Rx











Allergies: 








morphine Allergy (Verified 12/16/20 17:55)


   Rash


Sulfa (Sulfonamide Antibiotics) Allergy (Verified 12/16/20 17:55)


   Nausea


codeine Adverse Reaction (Mild, Verified 12/16/20 17:55)


   Nausea


zolpidem tartrate [From Ambien] Adverse Reaction (Mild, Verified 12/16/20 17:55)


   


   BAD DREAMS 


ciprofloxacin HCl [From Cipro] Adverse Reaction (Verified 12/16/20 17:55)


   Nausea








- Discharge Instructions


Discharge Instructions:: 


Follow-up with the PCP in 1 week please check your blood pressure daily and take

the readings with you when you visit primary care clinic





FOCUS:  Transition from Acute Care after Discharge


  GOAL:   Successful transition to care in the community





YOUR TASKS:   (1) review all information outlined in your discharge packet


      (2) follow any instructions outlined in your discharge packet


      (3) contact your primary care provider if you have questions or need 

additional assistance





See patient discharge instruction sheet for detailed teaching. Patient 

verbalizes understanding of medications and is able to verbalize follow-up care.

See Discharge Plan for additional discharge information. Patient secured in 

private vehicle prior to departure.


Activity:: Activity as Tolerated


Nourishment:: Heart Healthy Diet





- Follow up Plan


Referrals: 


Abhay Rosales Jr, MD [Primary Care Provider] - 7 Days (*** CALL FOR FOLLOW-UP 

APPOINTMENT ***)


Disposition: HOME





Quality





- Care Measures


CORE MEASURES:: N/A

## 2020-12-18 NOTE — PDOC.HOSPP
- Subjective


Encounter Date: 12/18/20


Encounter Time: 11:00


Subjective: 


Patient is quite anxious to go home however I need to have a difficult change of

plan as systolic blood pressure is 180 needs better control prior to go home.





- Objective


Vital Signs & Weight: 


                             Vital Signs (12 hours)











  Temp Pulse Resp BP BP BP BP


 


 12/18/20 11:30  97.4 F L  68  18  201/84 H   


 


 12/18/20 07:34  97.9 F  73  16  151/68 H   


 


 12/18/20 06:43   72     


 


 12/18/20 05:00  97.4 F L  80  18   204/85 H  185/88 H  204/87 H














  Pulse Ox


 


 12/18/20 11:30  98


 


 12/18/20 07:34  96


 


 12/18/20 06:43 


 


 12/18/20 05:00  97








                                     Weight











Weight                         118 lb 5 oz














I&O: 


                                        











 12/17/20 12/18/20 12/19/20





 06:59 06:59 06:59


 


Intake Total 120 720 


 


Output Total 700 1300 


 


Balance -580 -580 











Result Diagrams: 


                                 12/17/20 10:17





                                 12/17/20 10:17





Hospitalist ROS





- Medication


Medications: 


Active Medications











Generic Name Dose Route Start Last Admin





  Trade Name Freq  PRN Reason Stop Dose Admin


 


Hydrocodone Bitart/Acetaminophen  1 tab  12/16/20 12:57  12/18/20 11:27





  Hydrocodone/Acetaminophen 5/325 Mg Tablet  PO   1 tab





  Q4H PRN   Administration





  Pain  


 


Alprazolam  2 mg  12/17/20 21:00  12/17/20 22:05





  Alprazolam 1 Mg Tab  PO   2 mg





  HS JOHN   Administration


 


Amlodipine Besylate  10 mg  12/18/20 12:15  12/18/20 12:15





  Amlodipine 10 Mg Tab  PO  12/18/20 14:15  10 mg





  1215 JOHN   Administration


 


Aspirin  81 mg  12/17/20 09:00  12/18/20 08:58





  Aspirin 81 Mg Enteric Coated Tablet  PO   81 mg





  DAILY JOHN   Administration


 


Atorvastatin Calcium  10 mg  12/16/20 21:00  12/17/20 20:47





  Atorvastatin Calcium 10 Mg Tab  PO   10 mg





  HS JOHN   Administration


 


Docusate Sodium  100 mg  12/17/20 09:00  12/18/20 08:58





  Docusate 100 Mg Cap  PO   100 mg





  DAILY JOHN   Administration


 


Famotidine  20 mg  12/17/20 21:00  12/17/20 20:47





  Famotidine 20 Mg Tab  PO   20 mg





  HS JOHN   Administration


 


Hydralazine HCl  10 mg  12/16/20 18:14  12/17/20 10:01





  Hydralazine 20 Mg/Ml Vial  SLOW IVP   10 mg





  Q4H PRN   Administration





  SBP > 180 and HR < 70  


 


Labetalol HCl  20 mg  12/16/20 18:14  12/18/20 06:43





  Labetalol Hcl 100 Mg/20 Ml Vial  SLOW IVP   20 mg





  Q4H PRN   Administration





  SBP > 180 and HR >/= 70  


 


Metoprolol Tartrate  50 mg  12/16/20 21:00  12/18/20 08:58





  Metoprolol Tartrate 50 Mg Tab  PO   50 mg





  BID JOHN   Administration


 


Thyroid  30 mg  12/17/20 06:00  12/18/20 05:48





  Thyroid 30 Mg Tab  PO   30 mg





  0600 JOHN   Administration














- Exam


General Appearance: NAD, awake alert


Eye: PERRL


ENT: normocephalic atraumatic


Neck: supple


Heart: RRR


Respiratory: CTAB, normal chest expansion


Gastrointestinal: soft, normal bowel sounds


Psychiatric: normal affect, normal behavior, A&O x 3





Hosp A/P





- Plan











85-year-old female presented with


Pre syncope





-Sinus rhythm echo showed EF of 70% and diastolic dysfunction.


Stress test negative.








(2) Chest pain


Code(s): R07.9 - CHEST PAIN, UNSPECIFIED   Status: Resolved   


Plan: 


Troponins negative x 3











(3) Hypertension


Code(s): I10 - ESSENTIAL (PRIMARY) HYPERTENSION   Status: Chronic   


It seems she has underlying suboptimally controlled blood pressure.


Norvasc added.











(4) Right hip pain


Code(s): M25.551 - PAIN IN RIGHT HIP   Status: Chronic   


Plan: 


Continue Norco 


She is quite anxious to go home.  Discussed about following with the blood 

pressure.At home she does have a blood pressure cuff. 


 Also adding Norvasc to her regimen.  Her latest blood pressure seems to be 

going up.  If it improved, that is systolic blood pressure less than 160,  then 

possible discharge home today with close follow-up with the primary care phys

dean.





Plan changed-as her blood pressure still in the high end closer to 180 systolic.

-will try adding hydralazine p.o. and monitor for another  day.

## 2020-12-19 VITALS — TEMPERATURE: 97.6 F

## 2020-12-19 VITALS — SYSTOLIC BLOOD PRESSURE: 144 MMHG | DIASTOLIC BLOOD PRESSURE: 66 MMHG

## 2020-12-19 RX ADMIN — Medication SCH ML: at 09:03

## 2020-12-19 RX ADMIN — ASPIRIN SCH MG: 81 TABLET ORAL at 09:03

## 2020-12-19 RX ADMIN — HYDROCODONE BITARTRATE AND ACETAMINOPHEN PRN TAB: 5; 325 TABLET ORAL at 04:29

## 2020-12-19 NOTE — PDOC.BPN
- Brief Progress Note


Encounter Date: 12/19/20


Encounter Time: 10:52














THe patient still reports some dizziness while wlaking, but states it is from a 

blood flow restriction from her severe hip osteoarthritis. Per nurse, she 

refused cTA. 





The patient denies cough or chest pain or SOB. Orthostatics negative. She states

her BP is normally 160 at home and that is when she feels not dizzy.   SHe 

states amlodipine makes her more dizzy and she stopped taking it at home.  











Physical exam: repeat /66


GEn: alert, awake, oriented times three


CV: RRR, no murmurs, rubs, gallops


Lungs: CTAB


Abdomen: +BS, soft, nontender, nondistended


Ext: no edema. Decreased strength right leg


Neuro: CN II-XII intact. No sensation, loss of motor strength in extremities








Plan:





#Dizziness


#Hypertensive urgency


- orthostatics negative. Dizziness could be related to blood flow restriction 

per patient from severe hip arthritis which she is planning on having replaced 

in January .  


- stable for dc, advise patient to not take amlodipine if she does not feel 

comfortable, but take hydralazin 25 mg tid if BP > 150-160. She understands plan

and does not want me to call her daughter to explain this to her





#Left kidney cyst


- patient is aware of this and this has been there for years








#Bilateral pulm nodules


- patient is aware of this already

## 2020-12-19 NOTE — EKG
Test Reason : 

Blood Pressure : ***/*** mmHG

Vent. Rate : 098 BPM     Atrial Rate : 098 BPM

   P-R Int : 162 ms          QRS Dur : 086 ms

    QT Int : 364 ms       P-R-T Axes : 029 -43 058 degrees

   QTc Int : 464 ms

 

Normal sinus rhythm

Left axis deviation

RSR' or QR pattern in V1 suggests right ventricular conduction delay

ST abnormality, possible digitalis effect

Abnormal ECG

 

Confirmed by KIERRA GHOSH DO ([id number]),  CYRIL RIOJAS ([id number]) on 12/19/2020 3:39:04 PM

 

Referred By:             Confirmed By:KIERRA GHOSH DO

## 2020-12-22 NOTE — PQF
CLINICAL DOCUMENTATION CLARIFICATION FORM:





Dear : Beau Nielson MD                                               
Date / Time: 12/22/2020



Please exercise your independent, professional judgment in responding to the 
clarification form. 

Clinical indicators are provided on the bottom of this form for your review



Please check appropriate box(es): to clarify the etiology of chest pain



[  ] Chest pain due to hypertensive urgency

[ x ] Chest pain due to anxiety

[  ] Other diagnosis ___________(Please specify if any)

[  ] Unable to determine



Physician Signature:                         Date/Time:



For continuity of documentation, please document condition throughout progress 
notes and discharge summary.  Thank You.



To be completed by CDI/Coding staff for physician review: 







 Present Clinical Indicators - Signs / Symptoms / Labs Results and Location in 

Medical Record

 

[x] Patient did have some intermittent chest pain yesterday  H&P on 12/16

 

[x]  Midsternal pressure that resolved on its own H&P on 12/16

 

[x] Patient anxious, likely driving up BP Hospitalist PN on 12/17

 

[x] Hypertensive urgency Progress notes on 12/19

 

[x] She is quite anxious to go home. Hospitalist PN on 12/18

 

Present Risk Factors                                                            
              Results and Location in Medical Record

 

[x]   Aged person 85yrs H&P on 12/16

 

[ ]  

 

Present Treatments                                                              
               Results and Location in Medical Record

 

[x] Placed back on cardiac monitoring Emergency room visit on 12/16

 

[x] Apresoline 10 mg IV Medication from 12/16 to 12/19

 

[x] Labetalol 20mg IV  Medication from 12/16 to 12/19

 

[x]  Xanax 2 mg PO  Medication from 12/17 to 12/19





CDS/ Signature: [facility]      Phone #: _____________        Date/Time: 
12/22/2020



                 This is a permanent part of the Medical Record

[facility]

## 2022-08-11 ENCOUNTER — APPOINTMENT (RX ONLY)
Dept: URBAN - METROPOLITAN AREA CLINIC 99 | Facility: CLINIC | Age: 87
Setting detail: DERMATOLOGY
End: 2022-08-11

## 2022-08-11 DIAGNOSIS — L43.8 OTHER LICHEN PLANUS: ICD-10-CM | Status: INADEQUATELY CONTROLLED

## 2022-08-11 DIAGNOSIS — L29.89 OTHER PRURITUS: ICD-10-CM

## 2022-08-11 DIAGNOSIS — L29.8 OTHER PRURITUS: ICD-10-CM

## 2022-08-11 DIAGNOSIS — Z71.89 OTHER SPECIFIED COUNSELING: ICD-10-CM

## 2022-08-11 PROBLEM — L30.9 DERMATITIS, UNSPECIFIED: Status: ACTIVE | Noted: 2022-08-11

## 2022-08-11 PROBLEM — D48.5 NEOPLASM OF UNCERTAIN BEHAVIOR OF SKIN: Status: ACTIVE | Noted: 2022-08-11

## 2022-08-11 PROCEDURE — 99203 OFFICE O/P NEW LOW 30 MIN: CPT | Mod: 25

## 2022-08-11 PROCEDURE — ? TREATMENT REGIMEN

## 2022-08-11 PROCEDURE — 40490 BIOPSY OF LIP: CPT

## 2022-08-11 PROCEDURE — ? BIOPSY BY SHAVE METHOD

## 2022-08-11 PROCEDURE — 11102 TANGNTL BX SKIN SINGLE LES: CPT | Mod: 59

## 2022-08-11 PROCEDURE — ? PRESCRIPTION

## 2022-08-11 PROCEDURE — ? COUNSELING

## 2022-08-11 RX ORDER — MUPIROCIN 20 MG/G
OINTMENT TOPICAL
Qty: 22 | Refills: 0 | Status: ERX | COMMUNITY
Start: 2022-08-11

## 2022-08-11 RX ORDER — TRIAMCINOLONE ACETONIDE 1 MG/G
CREAM TOPICAL BID
Qty: 80 | Refills: 0 | Status: ERX | COMMUNITY
Start: 2022-08-11

## 2022-08-11 RX ADMIN — TRIAMCINOLONE ACETONIDE: 1 CREAM TOPICAL at 00:00

## 2022-08-11 RX ADMIN — MUPIROCIN: 20 OINTMENT TOPICAL at 00:00

## 2022-08-11 ASSESSMENT — LOCATION SIMPLE DESCRIPTION DERM
LOCATION SIMPLE: LEFT FOOT
LOCATION SIMPLE: RIGHT LIP

## 2022-08-11 ASSESSMENT — LOCATION ZONE DERM
LOCATION ZONE: FEET
LOCATION ZONE: LIP

## 2022-08-11 ASSESSMENT — LOCATION DETAILED DESCRIPTION DERM
LOCATION DETAILED: LEFT LATERAL DORSAL FOOT
LOCATION DETAILED: RIGHT INFERIOR VERMILION LIP

## 2022-08-11 NOTE — PROCEDURE: TREATMENT REGIMEN
Detail Level: Zone
Initiate Treatment: triamcinolone acetonide 0.1 % topical cream BID: Apply twice daily to itchy areas on left foot for up to 2 weeks. Stop 1 week. Repeat as needed.
Initiate Treatment: mupirocin 2 % topical ointment : Apply topically to biopsy site right lower leg daily.

## 2022-08-11 NOTE — PROCEDURE: BIOPSY BY SHAVE METHOD
Detail Level: Detailed
Depth Of Biopsy: dermis
Was A Bandage Applied: Yes
Size Of Lesion In Cm: 0
Biopsy Type: H and E
Biopsy Method: Dermablade
Anesthesia Type: 1% lidocaine with epinephrine
Anesthesia Volume In Cc (Will Not Render If 0): 0.5
Hemostasis: Drysol
Wound Care: Petrolatum
Dressing: bandage
Destruction After The Procedure: No
Type Of Destruction Used: Curettage
Curettage Text: The wound bed was treated with curettage after the biopsy was performed.
Cryotherapy Text: The wound bed was treated with cryotherapy after the biopsy was performed.
Electrodesiccation Text: The wound bed was treated with electrodesiccation after the biopsy was performed.
Electrodesiccation And Curettage Text: The wound bed was treated with electrodesiccation and curettage after the biopsy was performed.
Silver Nitrate Text: The wound bed was treated with silver nitrate after the biopsy was performed.
Lab: [id number]
Lab Facility: [id number]
Consent: Written consent was obtained and risks were reviewed including but not limited to scarring, infection, bleeding, scabbing, incomplete removal, nerve damage and allergy to anesthesia.
Post-Care Instructions: I reviewed with the patient in detail post-care instructions. Patient is to keep the biopsy site dry overnight, and then apply bacitracin twice daily until healed. Patient may apply hydrogen peroxide soaks to remove any crusting.
Notification Instructions: Patient will be notified of biopsy results. However, patient instructed to call the office if not contacted within 2 weeks.
Billing Type: Third-Party Bill
Information: Selecting Yes will display possible errors in your note based on the variables you have selected. This validation is only offered as a suggestion for you. PLEASE NOTE THAT THE VALIDATION TEXT WILL BE REMOVED WHEN YOU FINALIZE YOUR NOTE. IF YOU WANT TO FAX A PRELIMINARY NOTE YOU WILL NEED TO TOGGLE THIS TO 'NO' IF YOU DO NOT WANT IT IN YOUR FAXED NOTE.
Size Of Lesion In Cm: 1
X Size Of Lesion In Cm: 1.2
Hemostasis: Electrocautery
Lab: [id number]
Lab Facility: [id number]
Billing Type: Third-Party Bill

## 2022-08-11 NOTE — PROCEDURE: MIPS QUALITY
Additional Notes: Patient has not received COVID vaccinations.
Quality 111:Pneumonia Vaccination Status For Older Adults: Pneumococcal vaccine administered on or after patient’s 60th birthday and before the end of the measurement period
Quality 110: Preventive Care And Screening: Influenza Immunization: Influenza Immunization Administered during Influenza season
Quality 47: Advance Care Plan: Advance Care Planning discussed and documented; advance care plan or surrogate decision maker documented in the medical record.
Quality 431: Preventive Care And Screening: Unhealthy Alcohol Use - Screening: Patient not identified as an unhealthy alcohol user when screened for unhealthy alcohol use using a systematic screening method
Detail Level: Detailed
Quality 226: Preventive Care And Screening: Tobacco Use: Screening And Cessation Intervention: Patient screened for tobacco use and is an ex/non-smoker

## 2022-09-13 ENCOUNTER — APPOINTMENT (RX ONLY)
Dept: URBAN - METROPOLITAN AREA CLINIC 99 | Facility: CLINIC | Age: 87
Setting detail: DERMATOLOGY
End: 2022-09-13

## 2022-09-13 DIAGNOSIS — L43.8 OTHER LICHEN PLANUS: ICD-10-CM

## 2022-09-13 DIAGNOSIS — Z71.89 OTHER SPECIFIED COUNSELING: ICD-10-CM

## 2022-09-13 DIAGNOSIS — L82.1 OTHER SEBORRHEIC KERATOSIS: ICD-10-CM

## 2022-09-13 PROCEDURE — ? TREATMENT REGIMEN

## 2022-09-13 PROCEDURE — 99213 OFFICE O/P EST LOW 20 MIN: CPT

## 2022-09-13 PROCEDURE — ? COUNSELING

## 2022-09-13 PROCEDURE — ? PRESCRIPTION

## 2022-09-13 RX ORDER — ALCLOMETASONE DIPROPIONATE 0.5 MG/G
OINTMENT TOPICAL
Qty: 60 | Refills: 0 | Status: ERX | COMMUNITY
Start: 2022-09-13

## 2022-09-13 RX ORDER — CLOBETASOL PROPIONATE 0.5 MG/G
OINTMENT TOPICAL
Qty: 60 | Refills: 0 | Status: ERX | COMMUNITY
Start: 2022-09-13

## 2022-09-13 RX ORDER — TACROLIMUS 1 MG/G
OINTMENT TOPICAL
Qty: 100 | Refills: 0 | Status: ERX | COMMUNITY
Start: 2022-09-13

## 2022-09-13 RX ADMIN — ALCLOMETASONE DIPROPIONATE: 0.5 OINTMENT TOPICAL at 00:00

## 2022-09-13 RX ADMIN — TACROLIMUS: 1 OINTMENT TOPICAL at 00:00

## 2022-09-13 RX ADMIN — CLOBETASOL PROPIONATE: 0.5 OINTMENT TOPICAL at 00:00

## 2022-09-13 ASSESSMENT — LOCATION ZONE DERM
LOCATION ZONE: LEG
LOCATION ZONE: LIP

## 2022-09-13 ASSESSMENT — LOCATION SIMPLE DESCRIPTION DERM
LOCATION SIMPLE: LEFT LIP
LOCATION SIMPLE: RIGHT PRETIBIAL REGION

## 2022-09-13 ASSESSMENT — LOCATION DETAILED DESCRIPTION DERM
LOCATION DETAILED: LEFT INFERIOR VERMILION LIP
LOCATION DETAILED: RIGHT DISTAL PRETIBIAL REGION

## 2022-09-13 NOTE — PROCEDURE: TREATMENT REGIMEN
Initiate Treatment: Alclometasone ointment\\ntacrolimus 0.1 %\\nClobetasol ointment
Detail Level: Zone

## 2023-01-16 ENCOUNTER — HOSPITAL ENCOUNTER (OUTPATIENT)
Dept: HOSPITAL 18 - NAV LABSP | Age: 88
Discharge: HOME | End: 2023-01-16
Payer: MEDICARE

## 2023-01-16 DIAGNOSIS — D64.9: ICD-10-CM

## 2023-01-16 DIAGNOSIS — E11.9: ICD-10-CM

## 2023-01-16 DIAGNOSIS — E78.1: Primary | ICD-10-CM

## 2023-01-16 LAB
ALBUMIN SERPL BCG-MCNC: 3.9 G/DL (ref 3.4–4.8)
ALP SERPL-CCNC: 75 U/L (ref 40–110)
ALT SERPL W P-5'-P-CCNC: 12 U/L (ref 8–55)
ANION GAP SERPL CALC-SCNC: 13 MMOL/L (ref 10–20)
AST SERPL-CCNC: 17 U/L (ref 5–34)
BASOPHILS # BLD AUTO: 0 THOU/UL (ref 0–0.2)
BASOPHILS NFR BLD AUTO: 0.8 % (ref 0–1)
BILIRUB DIRECT SERPL-MCNC: 0.1 MG/DL (ref 0.1–0.3)
BILIRUB SERPL-MCNC: 0.2 MG/DL (ref 0.2–1.2)
BUN SERPL-MCNC: 23 MG/DL (ref 9.8–20.1)
CALCIUM SERPL-MCNC: 9.7 MG/DL (ref 7.8–10.44)
CHD RISK SERPL-RTO: 3.9 (ref ?–4.5)
CHLORIDE SERPL-SCNC: 102 MMOL/L (ref 98–107)
CHOLEST SERPL-MCNC: 224 MG/DL
CO2 SERPL-SCNC: 29 MMOL/L (ref 23–31)
CREAT CL PREDICTED SERPL C-G-VRATE: 0 ML/MIN (ref 70–130)
EOSINOPHIL # BLD AUTO: 0.3 THOU/UL (ref 0–0.7)
EOSINOPHIL NFR BLD AUTO: 5 % (ref 0–10)
FERRITIN SERPL-MCNC: 38.22 NG/ML (ref 10–291)
GLUCOSE SERPL-MCNC: 83 MG/DL (ref 83–110)
HDLC SERPL-MCNC: 58 MG/DL
HGB BLD-MCNC: 12.2 G/DL (ref 12–16)
IRON SERPL-MCNC: 70 UG/DL (ref 50–170)
LDLC SERPL CALC-MCNC: 117 MG/DL
LYMPHOCYTES # BLD AUTO: 1.5 THOU/UL (ref 1.2–3.4)
LYMPHOCYTES NFR BLD AUTO: 25.9 % (ref 21–51)
MCH RBC QN AUTO: 30.3 PG (ref 27–31)
MCV RBC AUTO: 93.8 FL (ref 78–98)
MONOCYTES # BLD AUTO: 0.6 THOU/UL (ref 0.11–0.59)
MONOCYTES NFR BLD AUTO: 10.8 % (ref 0–10)
NEUTROPHILS # BLD AUTO: 3.2 THOU/UL (ref 1.4–6.5)
NEUTROPHILS NFR BLD AUTO: 57.5 % (ref 42–75)
PLATELET # BLD AUTO: 198 10X3/UL (ref 130–400)
POTASSIUM SERPL-SCNC: 3.6 MMOL/L (ref 3.5–5.1)
RBC # BLD AUTO: 4.02 MILL/UL (ref 4.2–5.4)
SODIUM SERPL-SCNC: 140 MMOL/L (ref 136–145)
T4 FREE SERPL-MCNC: 1.16 NG/DL (ref 0.7–1.48)
TRIGL SERPL-MCNC: 246 MG/DL (ref ?–150)
VIT B12 SERPL-MCNC: 750 PG/ML (ref 211–911)
WBC # BLD AUTO: 5.6 10X3/UL (ref 4.8–10.8)

## 2023-01-16 PROCEDURE — 82607 VITAMIN B-12: CPT

## 2023-01-16 PROCEDURE — 83540 ASSAY OF IRON: CPT

## 2023-01-16 PROCEDURE — 85025 COMPLETE CBC W/AUTO DIFF WBC: CPT

## 2023-01-16 PROCEDURE — 84439 ASSAY OF FREE THYROXINE: CPT

## 2023-01-16 PROCEDURE — 80076 HEPATIC FUNCTION PANEL: CPT

## 2023-01-16 PROCEDURE — 80048 BASIC METABOLIC PNL TOTAL CA: CPT

## 2023-01-16 PROCEDURE — 83036 HEMOGLOBIN GLYCOSYLATED A1C: CPT

## 2023-01-16 PROCEDURE — 80061 LIPID PANEL: CPT

## 2023-01-16 PROCEDURE — 82728 ASSAY OF FERRITIN: CPT

## 2023-01-17 LAB
FOLLOW-UP CHEMISTRY COMP?: YES
FOLLOW-UP HEMATOLOGY COMP?: YES
FOLLOW-UP RESULT - CHEMISTRY: (no result)
FOLLOW-UP RESULT - HEMATOLOGY: (no result)

## 2023-02-22 ENCOUNTER — HOSPITAL ENCOUNTER (OUTPATIENT)
Dept: HOSPITAL 92 - CSHERS | Age: 88
Setting detail: OBSERVATION
LOS: 2 days | Discharge: HOME | End: 2023-02-24
Attending: FAMILY MEDICINE | Admitting: INTERNAL MEDICINE
Payer: MEDICARE

## 2023-02-22 DIAGNOSIS — E78.5: ICD-10-CM

## 2023-02-22 DIAGNOSIS — Z88.8: ICD-10-CM

## 2023-02-22 DIAGNOSIS — R42: ICD-10-CM

## 2023-02-22 DIAGNOSIS — Z88.2: ICD-10-CM

## 2023-02-22 DIAGNOSIS — E03.9: ICD-10-CM

## 2023-02-22 DIAGNOSIS — G89.29: ICD-10-CM

## 2023-02-22 DIAGNOSIS — R91.1: ICD-10-CM

## 2023-02-22 DIAGNOSIS — Z88.5: ICD-10-CM

## 2023-02-22 DIAGNOSIS — K21.9: ICD-10-CM

## 2023-02-22 DIAGNOSIS — F41.9: ICD-10-CM

## 2023-02-22 DIAGNOSIS — R07.89: Primary | ICD-10-CM

## 2023-02-22 DIAGNOSIS — R06.02: ICD-10-CM

## 2023-02-22 DIAGNOSIS — N19: ICD-10-CM

## 2023-02-22 DIAGNOSIS — Z79.899: ICD-10-CM

## 2023-02-22 DIAGNOSIS — E87.6: ICD-10-CM

## 2023-02-22 DIAGNOSIS — I10: ICD-10-CM

## 2023-02-22 DIAGNOSIS — R79.89: ICD-10-CM

## 2023-02-22 DIAGNOSIS — R77.8: ICD-10-CM

## 2023-02-22 DIAGNOSIS — F32.A: ICD-10-CM

## 2023-02-22 LAB
ALBUMIN SERPL BCG-MCNC: 3.9 G/DL (ref 3.4–4.8)
ALP SERPL-CCNC: 65 U/L (ref 40–110)
ALT SERPL W P-5'-P-CCNC: 13 U/L (ref 8–55)
ANION GAP SERPL CALC-SCNC: 12 MMOL/L (ref 10–20)
AST SERPL-CCNC: 20 U/L (ref 5–34)
BASOPHILS # BLD AUTO: 0 10X3/UL (ref 0–0.2)
BASOPHILS NFR BLD AUTO: 0.1 % (ref 0–2)
BILIRUB SERPL-MCNC: 0.2 MG/DL (ref 0.2–1.2)
BUN SERPL-MCNC: 27 MG/DL (ref 9.8–20.1)
CALCIUM SERPL-MCNC: 10 MG/DL (ref 7.8–10.44)
CHLORIDE SERPL-SCNC: 101 MMOL/L (ref 98–107)
CK MB SERPL-MCNC: 3.8 NG/ML (ref 0–6.6)
CK MB SERPL-MCNC: 3.9 NG/ML (ref 0–6.6)
CK SERPL-CCNC: 120 U/L (ref 29–168)
CO2 SERPL-SCNC: 29 MMOL/L (ref 23–31)
CREAT CL PREDICTED SERPL C-G-VRATE: 0 ML/MIN (ref 70–130)
EOSINOPHIL # BLD AUTO: 0 10X3/UL (ref 0–0.5)
EOSINOPHIL NFR BLD AUTO: 0.3 % (ref 0–6)
GLOBULIN SER CALC-MCNC: 2.9 G/DL (ref 2.4–3.5)
GLUCOSE SERPL-MCNC: 101 MG/DL (ref 83–110)
HGB BLD-MCNC: 11.4 G/DL (ref 12–15.5)
LIPASE SERPL-CCNC: 36 U/L (ref 8–78)
LYMPHOCYTES NFR BLD AUTO: 13 % (ref 18–47)
MCH RBC QN AUTO: 29.9 PG (ref 27–33)
MCV RBC AUTO: 89 FL (ref 81.6–98.3)
MONOCYTES # BLD AUTO: 0.8 10X3/UL (ref 0–1.1)
MONOCYTES NFR BLD AUTO: 9.9 % (ref 0–10)
NEUTROPHILS # BLD AUTO: 6 10X3/UL (ref 1.5–8.4)
NEUTROPHILS NFR BLD AUTO: 76.3 % (ref 40–75)
PLATELET # BLD AUTO: 221 10X3/UL (ref 150–450)
POTASSIUM SERPL-SCNC: 3 MMOL/L (ref 3.5–5.1)
RBC # BLD AUTO: 3.81 10X6/UL (ref 3.9–5.03)
SODIUM SERPL-SCNC: 139 MMOL/L (ref 136–145)
WBC # BLD AUTO: 7.8 10X3/UL (ref 3.5–10.5)

## 2023-02-22 PROCEDURE — 93306 TTE W/DOPPLER COMPLETE: CPT

## 2023-02-22 PROCEDURE — 36415 COLL VENOUS BLD VENIPUNCTURE: CPT

## 2023-02-22 PROCEDURE — 84484 ASSAY OF TROPONIN QUANT: CPT

## 2023-02-22 PROCEDURE — 80053 COMPREHEN METABOLIC PANEL: CPT

## 2023-02-22 PROCEDURE — 82553 CREATINE MB FRACTION: CPT

## 2023-02-22 PROCEDURE — 96374 THER/PROPH/DIAG INJ IV PUSH: CPT

## 2023-02-22 PROCEDURE — 85025 COMPLETE CBC W/AUTO DIFF WBC: CPT

## 2023-02-22 PROCEDURE — 82550 ASSAY OF CK (CPK): CPT

## 2023-02-22 PROCEDURE — G0378 HOSPITAL OBSERVATION PER HR: HCPCS

## 2023-02-22 PROCEDURE — 73630 X-RAY EXAM OF FOOT: CPT

## 2023-02-22 PROCEDURE — 93971 EXTREMITY STUDY: CPT

## 2023-02-22 PROCEDURE — 93005 ELECTROCARDIOGRAM TRACING: CPT

## 2023-02-22 PROCEDURE — 80048 BASIC METABOLIC PNL TOTAL CA: CPT

## 2023-02-22 PROCEDURE — 96372 THER/PROPH/DIAG INJ SC/IM: CPT

## 2023-02-22 PROCEDURE — 83735 ASSAY OF MAGNESIUM: CPT

## 2023-02-22 PROCEDURE — 99285 EMERGENCY DEPT VISIT HI MDM: CPT

## 2023-02-22 PROCEDURE — 71045 X-RAY EXAM CHEST 1 VIEW: CPT

## 2023-02-22 PROCEDURE — 85379 FIBRIN DEGRADATION QUANT: CPT

## 2023-02-22 PROCEDURE — 83690 ASSAY OF LIPASE: CPT

## 2023-02-22 PROCEDURE — 71275 CT ANGIOGRAPHY CHEST: CPT

## 2023-02-23 LAB
ANION GAP SERPL CALC-SCNC: 12 MMOL/L (ref 10–20)
BUN SERPL-MCNC: 22 MG/DL (ref 9.8–20.1)
CALCIUM SERPL-MCNC: 10 MG/DL (ref 7.8–10.44)
CHLORIDE SERPL-SCNC: 103 MMOL/L (ref 98–107)
CK MB SERPL-MCNC: 3.7 NG/ML (ref 0–6.6)
CO2 SERPL-SCNC: 28 MMOL/L (ref 23–31)
CREAT CL PREDICTED SERPL C-G-VRATE: 0 ML/MIN (ref 70–130)
GLUCOSE SERPL-MCNC: 97 MG/DL (ref 83–110)
MAGNESIUM SERPL-MCNC: 1.8 MG/DL (ref 1.6–2.6)
POTASSIUM SERPL-SCNC: 4.6 MMOL/L (ref 3.5–5.1)
SODIUM SERPL-SCNC: 138 MMOL/L (ref 136–145)

## 2023-02-23 RX ADMIN — ASPIRIN SCH MG: 81 TABLET ORAL at 09:28

## 2023-02-24 VITALS — SYSTOLIC BLOOD PRESSURE: 141 MMHG | TEMPERATURE: 98.2 F | DIASTOLIC BLOOD PRESSURE: 64 MMHG

## 2023-02-24 LAB
ANION GAP SERPL CALC-SCNC: 14 MMOL/L (ref 10–20)
BASOPHILS # BLD AUTO: 0 10X3/UL (ref 0–0.2)
BASOPHILS NFR BLD AUTO: 0.7 % (ref 0–2)
BUN SERPL-MCNC: 14 MG/DL (ref 9.8–20.1)
CALCIUM SERPL-MCNC: 10 MG/DL (ref 7.8–10.44)
CHLORIDE SERPL-SCNC: 101 MMOL/L (ref 98–107)
CO2 SERPL-SCNC: 26 MMOL/L (ref 23–31)
CREAT CL PREDICTED SERPL C-G-VRATE: 0 ML/MIN (ref 70–130)
EOSINOPHIL # BLD AUTO: 0.3 10X3/UL (ref 0–0.5)
EOSINOPHIL NFR BLD AUTO: 5.6 % (ref 0–6)
GLUCOSE SERPL-MCNC: 115 MG/DL (ref 83–110)
HGB BLD-MCNC: 12.8 G/DL (ref 12–15.5)
LYMPHOCYTES NFR BLD AUTO: 17.5 % (ref 18–47)
MCH RBC QN AUTO: 29.4 PG (ref 27–33)
MCV RBC AUTO: 89.9 FL (ref 81.6–98.3)
MONOCYTES # BLD AUTO: 0.7 10X3/UL (ref 0–1.1)
MONOCYTES NFR BLD AUTO: 11.4 % (ref 0–10)
NEUTROPHILS # BLD AUTO: 3.8 10X3/UL (ref 1.5–8.4)
NEUTROPHILS NFR BLD AUTO: 64.5 % (ref 40–75)
PLATELET # BLD AUTO: 199 10X3/UL (ref 150–450)
POTASSIUM SERPL-SCNC: 3.4 MMOL/L (ref 3.5–5.1)
RBC # BLD AUTO: 4.35 10X6/UL (ref 3.9–5.03)
SODIUM SERPL-SCNC: 138 MMOL/L (ref 136–145)
WBC # BLD AUTO: 5.9 10X3/UL (ref 3.5–10.5)

## 2023-02-24 RX ADMIN — ASPIRIN SCH MG: 81 TABLET ORAL at 09:57

## 2024-02-23 ENCOUNTER — HOSPITAL ENCOUNTER (EMERGENCY)
Dept: HOSPITAL 92 - CSHERS | Age: 89
Discharge: HOME | End: 2024-02-23
Payer: MEDICARE

## 2024-02-23 DIAGNOSIS — U07.1: Primary | ICD-10-CM

## 2024-02-23 DIAGNOSIS — I10: ICD-10-CM

## 2024-02-23 DIAGNOSIS — E78.00: ICD-10-CM

## 2024-02-23 DIAGNOSIS — Z55.6: ICD-10-CM

## 2024-02-23 DIAGNOSIS — E03.9: ICD-10-CM

## 2024-02-23 DIAGNOSIS — F41.9: ICD-10-CM

## 2024-02-23 PROCEDURE — 99283 EMERGENCY DEPT VISIT LOW MDM: CPT

## 2024-02-23 SDOH — EDUCATIONAL SECURITY - EDUCATION ATTAINMENT: PROBLEMS RELATED TO HEALTH LITERACY: Z55.6

## 2025-01-17 ENCOUNTER — HOSPITAL ENCOUNTER (OUTPATIENT)
Dept: HOSPITAL 92 - CSHCT | Age: 89
Discharge: HOME | End: 2025-01-17
Payer: MEDICARE

## 2025-01-17 DIAGNOSIS — M25.462: ICD-10-CM

## 2025-01-17 DIAGNOSIS — I70.8: ICD-10-CM

## 2025-01-17 DIAGNOSIS — M47.816: ICD-10-CM

## 2025-01-17 DIAGNOSIS — I70.0: ICD-10-CM

## 2025-01-17 DIAGNOSIS — M85.9: ICD-10-CM

## 2025-01-17 DIAGNOSIS — Z98.890: ICD-10-CM

## 2025-01-17 DIAGNOSIS — I77.1: ICD-10-CM

## 2025-01-17 DIAGNOSIS — M25.461: ICD-10-CM

## 2025-01-17 DIAGNOSIS — M25.559: Primary | ICD-10-CM

## 2025-01-17 LAB — EGFRCR SERPLBLD CKD-EPI 2021: 71 ML/MIN/{1.73_M2}

## 2025-01-17 PROCEDURE — 75635 CT ANGIO ABDOMINAL ARTERIES: CPT

## 2025-01-17 PROCEDURE — 82565 ASSAY OF CREATININE: CPT
